# Patient Record
Sex: FEMALE | Race: WHITE | Employment: UNEMPLOYED | URBAN - METROPOLITAN AREA
[De-identification: names, ages, dates, MRNs, and addresses within clinical notes are randomized per-mention and may not be internally consistent; named-entity substitution may affect disease eponyms.]

---

## 2024-03-06 ENCOUNTER — APPOINTMENT (OUTPATIENT)
Dept: CT IMAGING | Age: 64
End: 2024-03-06
Payer: MEDICAID

## 2024-03-06 ENCOUNTER — HOSPITAL ENCOUNTER (OUTPATIENT)
Age: 64
Setting detail: OBSERVATION
Discharge: ANOTHER HOSPITAL | End: 2024-03-08
Attending: EMERGENCY MEDICINE | Admitting: INTERNAL MEDICINE
Payer: MEDICAID

## 2024-03-06 ENCOUNTER — APPOINTMENT (OUTPATIENT)
Dept: GENERAL RADIOLOGY | Age: 64
End: 2024-03-06
Payer: MEDICAID

## 2024-03-06 DIAGNOSIS — F23: ICD-10-CM

## 2024-03-06 DIAGNOSIS — R07.9 CHEST PAIN, UNSPECIFIED TYPE: Primary | ICD-10-CM

## 2024-03-06 LAB
ALBUMIN SERPL-MCNC: 3.8 G/DL (ref 3.5–5.2)
ALBUMIN/GLOB SERPL: 1 {RATIO} (ref 1–2.5)
ALP SERPL-CCNC: 88 U/L (ref 35–104)
ALT SERPL-CCNC: <5 U/L (ref 10–35)
ANION GAP SERPL CALCULATED.3IONS-SCNC: 18 MMOL/L (ref 9–16)
APAP SERPL-MCNC: <5 UG/ML (ref 10–30)
AST SERPL-CCNC: 24 U/L (ref 10–35)
BILIRUB SERPL-MCNC: 0.8 MG/DL (ref 0–1.2)
BUN SERPL-MCNC: 12 MG/DL (ref 8–23)
CA-I BLD-SCNC: 1.17 MMOL/L (ref 1.13–1.33)
CALCIUM SERPL-MCNC: 9.7 MG/DL (ref 8.6–10.4)
CHLORIDE SERPL-SCNC: 99 MMOL/L (ref 98–107)
CO2 SERPL-SCNC: 25 MMOL/L (ref 20–31)
CREAT SERPL-MCNC: 0.8 MG/DL (ref 0.5–0.9)
ERYTHROCYTE [DISTWIDTH] IN BLOOD BY AUTOMATED COUNT: 13.7 % (ref 11.8–14.4)
ETHANOL PERCENT: <0.01 %
ETHANOLAMINE SERPL-MCNC: <10 MG/DL
FLUAV AG SPEC QL: NEGATIVE
FLUBV AG SPEC QL: NEGATIVE
GFR SERPL CREATININE-BSD FRML MDRD: >60 ML/MIN/1.73M2
GLUCOSE SERPL-MCNC: 124 MG/DL (ref 74–99)
HCT VFR BLD AUTO: 49.7 % (ref 36.3–47.1)
HGB BLD-MCNC: 15.9 G/DL (ref 11.9–15.1)
INR PPP: 1
LACTIC ACID, SEPSIS WHOLE BLOOD: 1.4 MMOL/L (ref 0.5–1.9)
LACTIC ACID, SEPSIS WHOLE BLOOD: 2.4 MMOL/L (ref 0.5–1.9)
MAGNESIUM SERPL-MCNC: 1.9 MG/DL (ref 1.6–2.4)
MCH RBC QN AUTO: 29.7 PG (ref 25.2–33.5)
MCHC RBC AUTO-ENTMCNC: 32 G/DL (ref 28.4–34.8)
MCV RBC AUTO: 92.7 FL (ref 82.6–102.9)
NRBC BLD-RTO: 0 PER 100 WBC
PLATELET # BLD AUTO: 185 K/UL (ref 138–453)
PMV BLD AUTO: 12.5 FL (ref 8.1–13.5)
POTASSIUM SERPL-SCNC: 4.2 MMOL/L (ref 3.7–5.3)
PROT SERPL-MCNC: 7.6 G/DL (ref 6.6–8.7)
PROTHROMBIN TIME: 13.3 SEC (ref 11.7–14.9)
RBC # BLD AUTO: 5.36 M/UL (ref 3.95–5.11)
SALICYLATES SERPL-MCNC: <1 MG/DL (ref 3–10)
SODIUM SERPL-SCNC: 142 MMOL/L (ref 136–145)
TOXIC TRICYCLIC SC,BLOOD: NEGATIVE
TROPONIN I SERPL HS-MCNC: 11 NG/L (ref 0–14)
TROPONIN I SERPL HS-MCNC: 12 NG/L (ref 0–14)
TSH SERPL DL<=0.05 MIU/L-ACNC: 2.09 UIU/ML (ref 0.27–4.2)
WBC OTHER # BLD: 9.8 K/UL (ref 3.5–11.3)

## 2024-03-06 PROCEDURE — 84484 ASSAY OF TROPONIN QUANT: CPT

## 2024-03-06 PROCEDURE — 71045 X-RAY EXAM CHEST 1 VIEW: CPT

## 2024-03-06 PROCEDURE — 74177 CT ABD & PELVIS W/CONTRAST: CPT

## 2024-03-06 PROCEDURE — 96361 HYDRATE IV INFUSION ADD-ON: CPT

## 2024-03-06 PROCEDURE — 6360000002 HC RX W HCPCS: Performed by: PEDIATRICS

## 2024-03-06 PROCEDURE — 83605 ASSAY OF LACTIC ACID: CPT

## 2024-03-06 PROCEDURE — 87635 SARS-COV-2 COVID-19 AMP PRB: CPT

## 2024-03-06 PROCEDURE — G0480 DRUG TEST DEF 1-7 CLASSES: HCPCS

## 2024-03-06 PROCEDURE — 80179 DRUG ASSAY SALICYLATE: CPT

## 2024-03-06 PROCEDURE — 80053 COMPREHEN METABOLIC PANEL: CPT

## 2024-03-06 PROCEDURE — 2580000003 HC RX 258: Performed by: PEDIATRICS

## 2024-03-06 PROCEDURE — 83735 ASSAY OF MAGNESIUM: CPT

## 2024-03-06 PROCEDURE — 85027 COMPLETE CBC AUTOMATED: CPT

## 2024-03-06 PROCEDURE — 84443 ASSAY THYROID STIM HORMONE: CPT

## 2024-03-06 PROCEDURE — 93005 ELECTROCARDIOGRAM TRACING: CPT | Performed by: PEDIATRICS

## 2024-03-06 PROCEDURE — 70450 CT HEAD/BRAIN W/O DYE: CPT

## 2024-03-06 PROCEDURE — 96374 THER/PROPH/DIAG INJ IV PUSH: CPT

## 2024-03-06 PROCEDURE — 6360000004 HC RX CONTRAST MEDICATION: Performed by: PEDIATRICS

## 2024-03-06 PROCEDURE — 80143 DRUG ASSAY ACETAMINOPHEN: CPT

## 2024-03-06 PROCEDURE — 84100 ASSAY OF PHOSPHORUS: CPT

## 2024-03-06 PROCEDURE — 87804 INFLUENZA ASSAY W/OPTIC: CPT

## 2024-03-06 PROCEDURE — 96372 THER/PROPH/DIAG INJ SC/IM: CPT

## 2024-03-06 PROCEDURE — 85610 PROTHROMBIN TIME: CPT

## 2024-03-06 PROCEDURE — 80307 DRUG TEST PRSMV CHEM ANLYZR: CPT

## 2024-03-06 PROCEDURE — 87040 BLOOD CULTURE FOR BACTERIA: CPT

## 2024-03-06 PROCEDURE — 51798 US URINE CAPACITY MEASURE: CPT

## 2024-03-06 PROCEDURE — 99285 EMERGENCY DEPT VISIT HI MDM: CPT

## 2024-03-06 PROCEDURE — 82330 ASSAY OF CALCIUM: CPT

## 2024-03-06 RX ORDER — SODIUM CHLORIDE, SODIUM LACTATE, POTASSIUM CHLORIDE, AND CALCIUM CHLORIDE .6; .31; .03; .02 G/100ML; G/100ML; G/100ML; G/100ML
1000 INJECTION, SOLUTION INTRAVENOUS ONCE
Status: COMPLETED | OUTPATIENT
Start: 2024-03-06 | End: 2024-03-06

## 2024-03-06 RX ORDER — MIDAZOLAM HYDROCHLORIDE 2 MG/2ML
3 INJECTION, SOLUTION INTRAMUSCULAR; INTRAVENOUS ONCE
Status: DISCONTINUED | OUTPATIENT
Start: 2024-03-06 | End: 2024-03-06

## 2024-03-06 RX ORDER — HALOPERIDOL 5 MG/ML
2 INJECTION INTRAMUSCULAR ONCE
Status: COMPLETED | OUTPATIENT
Start: 2024-03-06 | End: 2024-03-06

## 2024-03-06 RX ORDER — MIDAZOLAM HYDROCHLORIDE 2 MG/2ML
3 INJECTION, SOLUTION INTRAMUSCULAR; INTRAVENOUS ONCE
Status: COMPLETED | OUTPATIENT
Start: 2024-03-06 | End: 2024-03-06

## 2024-03-06 RX ORDER — LORAZEPAM 2 MG/ML
1 INJECTION INTRAMUSCULAR ONCE
Status: DISCONTINUED | OUTPATIENT
Start: 2024-03-06 | End: 2024-03-06

## 2024-03-06 RX ORDER — SODIUM CHLORIDE, SODIUM LACTATE, POTASSIUM CHLORIDE, AND CALCIUM CHLORIDE .6; .31; .03; .02 G/100ML; G/100ML; G/100ML; G/100ML
1000 INJECTION, SOLUTION INTRAVENOUS ONCE
Status: COMPLETED | OUTPATIENT
Start: 2024-03-06 | End: 2024-03-07

## 2024-03-06 RX ADMIN — MIDAZOLAM HYDROCHLORIDE 3 MG: 1 INJECTION, SOLUTION INTRAMUSCULAR; INTRAVENOUS at 18:02

## 2024-03-06 RX ADMIN — SODIUM CHLORIDE, POTASSIUM CHLORIDE, SODIUM LACTATE AND CALCIUM CHLORIDE 1000 ML: 600; 310; 30; 20 INJECTION, SOLUTION INTRAVENOUS at 22:14

## 2024-03-06 RX ADMIN — IOPAMIDOL 75 ML: 755 INJECTION, SOLUTION INTRAVENOUS at 19:49

## 2024-03-06 RX ADMIN — SODIUM CHLORIDE, POTASSIUM CHLORIDE, SODIUM LACTATE AND CALCIUM CHLORIDE 1000 ML: 600; 310; 30; 20 INJECTION, SOLUTION INTRAVENOUS at 18:14

## 2024-03-06 RX ADMIN — HALOPERIDOL LACTATE 2 MG: 5 INJECTION, SOLUTION INTRAMUSCULAR at 18:01

## 2024-03-06 ASSESSMENT — HEART SCORE
ECG: 0
ECG: 1
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
## 2024-03-06 NOTE — ED NOTES
Patient presents to ED with her brother and her sister. Patient states that she has been having chest pain and thinks that she is having a heart attack. Patient has altered mental status. Patient has history of schizophrenia and has not been taking her meds. Patient has been living with her brother and sister for the past 2 weeks because she was living with her son that was nto taking care of her. EKG is done, labs drawn, on cardiac monitor

## 2024-03-07 VITALS
SYSTOLIC BLOOD PRESSURE: 133 MMHG | HEART RATE: 96 BPM | TEMPERATURE: 98.4 F | BODY MASS INDEX: 28.58 KG/M2 | DIASTOLIC BLOOD PRESSURE: 80 MMHG | WEIGHT: 151.24 LBS | OXYGEN SATURATION: 96 % | RESPIRATION RATE: 15 BRPM

## 2024-03-07 PROBLEM — F23: Status: ACTIVE | Noted: 2024-03-07

## 2024-03-07 PROBLEM — F20.0 ACUTE EXACERBATION OF CHRONIC PARANOID SCHIZOPHRENIA (HCC): Status: ACTIVE | Noted: 2024-03-07

## 2024-03-07 LAB
AMPHET UR QL SCN: NEGATIVE
BACTERIA URNS QL MICRO: ABNORMAL
BARBITURATES UR QL SCN: NEGATIVE
BENZODIAZ UR QL: POSITIVE
BILIRUB UR QL STRIP: NEGATIVE
CANNABINOIDS UR QL SCN: NEGATIVE
CASTS #/AREA URNS LPF: ABNORMAL /LPF (ref 0–2)
CASTS #/AREA URNS LPF: ABNORMAL /LPF (ref 0–2)
CHOLEST SERPL-MCNC: 169 MG/DL (ref 0–199)
CHOLESTEROL/HDL RATIO: 4
CLARITY UR: ABNORMAL
COCAINE UR QL SCN: NEGATIVE
COLOR UR: YELLOW
EPI CELLS #/AREA URNS HPF: ABNORMAL /HPF (ref 0–5)
ERYTHROCYTE [DISTWIDTH] IN BLOOD BY AUTOMATED COUNT: 13.9 % (ref 11.8–14.4)
FENTANYL UR QL: NEGATIVE
GLUCOSE UR STRIP-MCNC: NEGATIVE MG/DL
HCT VFR BLD AUTO: 42.7 % (ref 36.3–47.1)
HDLC SERPL-MCNC: 41 MG/DL
HGB BLD-MCNC: 14.2 G/DL (ref 11.9–15.1)
HGB UR QL STRIP.AUTO: NEGATIVE
KETONES UR STRIP-MCNC: ABNORMAL MG/DL
LDLC SERPL CALC-MCNC: 112 MG/DL (ref 0–100)
LEUKOCYTE ESTERASE UR QL STRIP: ABNORMAL
MAGNESIUM SERPL-MCNC: 2.2 MG/DL (ref 1.6–2.4)
MCH RBC QN AUTO: 30.2 PG (ref 25.2–33.5)
MCHC RBC AUTO-ENTMCNC: 33.3 G/DL (ref 28.4–34.8)
MCV RBC AUTO: 90.9 FL (ref 82.6–102.9)
METHADONE UR QL: NEGATIVE
MUCOUS THREADS URNS QL MICRO: ABNORMAL
NITRITE UR QL STRIP: NEGATIVE
NRBC BLD-RTO: 0 PER 100 WBC
OPIATES UR QL SCN: NEGATIVE
OXYCODONE UR QL SCN: NEGATIVE
PCP UR QL SCN: NEGATIVE
PH UR STRIP: 5.5 [PH] (ref 5–8)
PLATELET # BLD AUTO: 162 K/UL (ref 138–453)
PMV BLD AUTO: 12.6 FL (ref 8.1–13.5)
PROT UR STRIP-MCNC: NEGATIVE MG/DL
RBC # BLD AUTO: 4.7 M/UL (ref 3.95–5.11)
RBC #/AREA URNS HPF: ABNORMAL /HPF (ref 0–2)
SARS-COV-2 RDRP RESP QL NAA+PROBE: NOT DETECTED
SP GR UR STRIP: 1.05 (ref 1–1.03)
SPECIMEN DESCRIPTION: NORMAL
TEST INFORMATION: ABNORMAL
TRIGL SERPL-MCNC: 79 MG/DL
TROPONIN I SERPL HS-MCNC: NORMAL NG/L (ref 0–14)
UROBILINOGEN UR STRIP-ACNC: NORMAL EU/DL (ref 0–1)
VLDLC SERPL CALC-MCNC: 16 MG/DL
WBC #/AREA URNS HPF: ABNORMAL /HPF (ref 0–5)
WBC OTHER # BLD: 8.8 K/UL (ref 3.5–11.3)

## 2024-03-07 PROCEDURE — 81001 URINALYSIS AUTO W/SCOPE: CPT

## 2024-03-07 PROCEDURE — 87077 CULTURE AEROBIC IDENTIFY: CPT

## 2024-03-07 PROCEDURE — 84484 ASSAY OF TROPONIN QUANT: CPT

## 2024-03-07 PROCEDURE — 2580000003 HC RX 258: Performed by: NURSE PRACTITIONER

## 2024-03-07 PROCEDURE — 36415 COLL VENOUS BLD VENIPUNCTURE: CPT

## 2024-03-07 PROCEDURE — 80307 DRUG TEST PRSMV CHEM ANLYZR: CPT

## 2024-03-07 PROCEDURE — 96375 TX/PRO/DX INJ NEW DRUG ADDON: CPT

## 2024-03-07 PROCEDURE — 6360000002 HC RX W HCPCS: Performed by: INTERNAL MEDICINE

## 2024-03-07 PROCEDURE — 83735 ASSAY OF MAGNESIUM: CPT

## 2024-03-07 PROCEDURE — 80061 LIPID PANEL: CPT

## 2024-03-07 PROCEDURE — G0378 HOSPITAL OBSERVATION PER HR: HCPCS

## 2024-03-07 PROCEDURE — 99235 HOSP IP/OBS SAME DATE MOD 70: CPT | Performed by: STUDENT IN AN ORGANIZED HEALTH CARE EDUCATION/TRAINING PROGRAM

## 2024-03-07 PROCEDURE — 85027 COMPLETE CBC AUTOMATED: CPT

## 2024-03-07 PROCEDURE — 93005 ELECTROCARDIOGRAM TRACING: CPT | Performed by: NURSE PRACTITIONER

## 2024-03-07 PROCEDURE — 87086 URINE CULTURE/COLONY COUNT: CPT

## 2024-03-07 PROCEDURE — 87186 SC STD MICRODIL/AGAR DIL: CPT

## 2024-03-07 RX ORDER — ONDANSETRON 2 MG/ML
4 INJECTION INTRAMUSCULAR; INTRAVENOUS EVERY 6 HOURS PRN
Status: DISCONTINUED | OUTPATIENT
Start: 2024-03-07 | End: 2024-03-08 | Stop reason: HOSPADM

## 2024-03-07 RX ORDER — CITALOPRAM HYDROBROMIDE 10 MG/1
10 TABLET ORAL DAILY
Status: DISCONTINUED | OUTPATIENT
Start: 2024-03-07 | End: 2024-03-08 | Stop reason: HOSPADM

## 2024-03-07 RX ORDER — POTASSIUM CHLORIDE 7.45 MG/ML
10 INJECTION INTRAVENOUS PRN
Status: DISCONTINUED | OUTPATIENT
Start: 2024-03-07 | End: 2024-03-08 | Stop reason: HOSPADM

## 2024-03-07 RX ORDER — MAGNESIUM SULFATE 1 G/100ML
1000 INJECTION INTRAVENOUS PRN
Status: DISCONTINUED | OUTPATIENT
Start: 2024-03-07 | End: 2024-03-08 | Stop reason: HOSPADM

## 2024-03-07 RX ORDER — ATORVASTATIN CALCIUM 40 MG/1
40 TABLET, FILM COATED ORAL NIGHTLY
Status: DISCONTINUED | OUTPATIENT
Start: 2024-03-07 | End: 2024-03-08 | Stop reason: HOSPADM

## 2024-03-07 RX ORDER — ACETAMINOPHEN 325 MG/1
650 TABLET ORAL EVERY 6 HOURS PRN
Status: DISCONTINUED | OUTPATIENT
Start: 2024-03-07 | End: 2024-03-08 | Stop reason: HOSPADM

## 2024-03-07 RX ORDER — SODIUM CHLORIDE 9 MG/ML
INJECTION, SOLUTION INTRAVENOUS CONTINUOUS
Status: DISCONTINUED | OUTPATIENT
Start: 2024-03-07 | End: 2024-03-08 | Stop reason: HOSPADM

## 2024-03-07 RX ORDER — ASPIRIN 81 MG/1
81 TABLET, CHEWABLE ORAL DAILY
Status: DISCONTINUED | OUTPATIENT
Start: 2024-03-07 | End: 2024-03-08 | Stop reason: HOSPADM

## 2024-03-07 RX ORDER — ONDANSETRON 4 MG/1
4 TABLET, ORALLY DISINTEGRATING ORAL EVERY 8 HOURS PRN
Status: DISCONTINUED | OUTPATIENT
Start: 2024-03-07 | End: 2024-03-08 | Stop reason: HOSPADM

## 2024-03-07 RX ORDER — POTASSIUM CHLORIDE 20 MEQ/1
40 TABLET, EXTENDED RELEASE ORAL PRN
Status: DISCONTINUED | OUTPATIENT
Start: 2024-03-07 | End: 2024-03-08 | Stop reason: HOSPADM

## 2024-03-07 RX ORDER — NITROFURANTOIN 25; 75 MG/1; MG/1
100 CAPSULE ORAL 2 TIMES DAILY
Qty: 10 CAPSULE | Refills: 0 | Status: ON HOLD | OUTPATIENT
Start: 2024-03-07 | End: 2024-03-12

## 2024-03-07 RX ORDER — SODIUM CHLORIDE 9 MG/ML
INJECTION, SOLUTION INTRAVENOUS PRN
Status: DISCONTINUED | OUTPATIENT
Start: 2024-03-07 | End: 2024-03-08 | Stop reason: HOSPADM

## 2024-03-07 RX ORDER — NITROGLYCERIN 0.4 MG/1
0.4 TABLET SUBLINGUAL EVERY 5 MIN PRN
Status: DISCONTINUED | OUTPATIENT
Start: 2024-03-07 | End: 2024-03-08 | Stop reason: HOSPADM

## 2024-03-07 RX ORDER — SODIUM CHLORIDE 0.9 % (FLUSH) 0.9 %
10 SYRINGE (ML) INJECTION PRN
Status: DISCONTINUED | OUTPATIENT
Start: 2024-03-07 | End: 2024-03-08 | Stop reason: HOSPADM

## 2024-03-07 RX ORDER — SODIUM CHLORIDE 0.9 % (FLUSH) 0.9 %
5-40 SYRINGE (ML) INJECTION EVERY 12 HOURS SCHEDULED
Status: DISCONTINUED | OUTPATIENT
Start: 2024-03-07 | End: 2024-03-08 | Stop reason: HOSPADM

## 2024-03-07 RX ORDER — ENOXAPARIN SODIUM 100 MG/ML
40 INJECTION SUBCUTANEOUS DAILY
Status: DISCONTINUED | OUTPATIENT
Start: 2024-03-07 | End: 2024-03-08 | Stop reason: HOSPADM

## 2024-03-07 RX ORDER — LORAZEPAM 2 MG/ML
1 INJECTION INTRAMUSCULAR EVERY 6 HOURS PRN
Status: DISCONTINUED | OUTPATIENT
Start: 2024-03-07 | End: 2024-03-08 | Stop reason: HOSPADM

## 2024-03-07 RX ORDER — ACETAMINOPHEN 650 MG/1
650 SUPPOSITORY RECTAL EVERY 6 HOURS PRN
Status: DISCONTINUED | OUTPATIENT
Start: 2024-03-07 | End: 2024-03-08 | Stop reason: HOSPADM

## 2024-03-07 RX ORDER — HALOPERIDOL 5 MG/ML
5 INJECTION INTRAMUSCULAR EVERY 6 HOURS PRN
Status: DISCONTINUED | OUTPATIENT
Start: 2024-03-07 | End: 2024-03-08 | Stop reason: HOSPADM

## 2024-03-07 RX ADMIN — SODIUM CHLORIDE: 9 INJECTION, SOLUTION INTRAVENOUS at 17:05

## 2024-03-07 RX ADMIN — Medication 1000 MG: at 06:26

## 2024-03-07 RX ADMIN — SODIUM CHLORIDE: 9 INJECTION, SOLUTION INTRAVENOUS at 04:46

## 2024-03-07 NOTE — ED NOTES
The following labs were labeled with appropriate pt sticker and tubed to lab:     [] Blue     [] Lavender   [] on ice  [] Green/yellow  [] Green/black [] on ice  [] Grey  [] on ice  [] Yellow  [x] Red  [] Pink  [] Type/ Screen  [] ABG  [] VBG    [x] COVID-19 swab    [x] Rapid  [] PCR  [x] Flu swab  [] Peds Viral Panel     [] Urine Sample  [] Fecal Sample  [] Pelvic Cultures  [] Blood Cultures  [] X 2  [] STREP Cultures  [] Wound Cultures
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
## 2024-03-07 NOTE — DISCHARGE SUMMARY
[facility]  Office: [phone]  Tramaine Nino DO, Ander Gaspar DO, Ulises Noguera DO, Calixto Botello DO, Madiha Franklin MD, Sujata Estrella MD, Arjun Santos MD, Amber De La Paz MD,  Keenan Mandujano MD, Sophie Tran MD, Mey Barrett MD,  Marciano Durbin DO, Amadou Hercules MD, Jacoby Shepherd MD, Kumar Nino DO, Louisa Dawson MD,  Lexa Dang DO, Laney Ramires MD, Estefani Rubio MD, Maria G Pabon MD, Patito Negron MD,  Kushal Aaron MD, Tamiko Barnes MD, Kelly Henriquez MD, Rafa Jaffe MD, Sergio Urbina MD, Maria Elena Doyle MD, Elvis Villarreal DO, Pancho Troy DO, Volodymyr Munoz MD,  Jossue Bowling MD, Shirley Waterhouse, CNP,  Swati Whaley, CNP, Cezar Lopez, CNP,  Sana Tierney, DNP, Ioana Brantley, CNP, Elena Garza, CNP, Misti Gonzalez CNP, Sugar Denise, CNP, Eleanor Colindres, CNP, Vandana Daugherty, PA-C, Shanna Harrison, PA-C, Usha Lawson, CNP, Loan Pagan, CNP, Cherri El, CNP, Tory Joseph, CNS, Amaya Pedraza, CNP, Gilda Segovia, CNP, Tracy Schwab, CNP         [facility]   IN-PATIENT SERVICE   [facility]    Discharge Summary     Patient ID: Leila Murphy  :  1960   MRN: [id number]     ACCOUNT:  [id number]   Patient's PCP: Tye Monroy MD  Admit Date: 3/6/2024   Discharge Date: 3/8/2024     Length of Stay: 1  Code Status:  Full Code  Admitting Physician: Maria Elena Doyle MD  Discharge Physician: Maria Elena Doyle MD     Active Discharge Diagnoses:     Hospital Problem Lists:  Principal Problem:    Chest pain  Resolved Problems:    * No resolved hospital problems. *      Admission Condition:  fair     Discharged Condition: good    Hospital Stay:     Hospital Course:     Leila Murphy is a 63 y.o. female with history of schizophrenia with paranoia with worsening psychotic symptoms since [facility] s/p inpatient psychiatric hospitalization  - .  Patient w/ worsening lethargy and re-evaluated at [facility] .
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
## 2024-03-07 NOTE — PROGRESS NOTES
[facility]  PROGRESS NOTE    Shift date: 3/6/2024  Shift day: Wednesday   Shift # 2    Room # 19/19   Name: Leila Murphy                Buddhist:    Place of Denominational:     Referral: Routine Visit    Admit Date & Time: 3/6/2024  5:33 PM    Assessment:  Leila Murphy is a 63 y.o. female in the hospital. Pt was not present in room at time of visit. Family member appeared coping and hopeful when engaged in conversation with .      Intervention:  Writer introduced self and title as . Family did not appear to mind  presence and engaged in conversation about the days events leading to patient's hospital visit.  provided a supportive presence through active listening and words of affirmation.    Outcome:  Family member appeared receptive to listening presence to talk through patient's health and days events.    Plan:  Chaplains will remain available to offer spiritual and emotional support as needed.      Electronically signed by Chaplain Latoya, on 3/6/2024 at 11:52 PM.  Spiritual Health  [place] [facility]  [phone]

## 2024-03-07 NOTE — PROGRESS NOTES
1700- Pt finally agreeable to use commado. 400mls out. Brief slightly wet.   Notified Dr Doyle @ [id number]
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
## 2024-03-07 NOTE — ED NOTES
The patient is a 64 year old female that has a reported history of Schizophrenia. The patient was brought to the ED today due to patient having chest pain and altered mental status. The patient is currently staying with her Bother and Sister. Her Siblings have concern that the patient is no longer able to care for herself due to the patient eating very little and not showering. Patient also incontinent of urine. Despite patient not wanting to eat or drink, patient's sister denied patient verbalizing any paranoid thoughts related to food or drink. The patient is currently laying in bed and not answering questions. When the patient does answer a question she states, \"Don't do it, I know what you are trying to do with me.\" Patient was given Haldol due to patient not wanting labs drawn and imagining done for her chest pain. The patient was psychiatrically hospitalized for similar presentation at [facility] in Jan 2023. Patient was prescribed Zoloft and Risperdal. Per the Sister, the patient's brother Derik Cartwright stopped given the patient her Risperdal. Coincidently, the patient's mental health symptoms became worse.

## 2024-03-07 NOTE — ED PROVIDER NOTES
[facility]     Emergency Department     Faculty Note/ Attestation      Pt Name: Leila Murphy                                       MRN: [id number]  Birthdate 1960  Date of evaluation: 3/6/2024  Note Started: 7:09 PM EST    Patients PCP:    Tye Monroy MD    Attestation  I performed a history and physical examination of the patient and discussed management with the resident. I reviewed the resident’s note and agree with the documented findings and plan of care. Any areas of disagreement are noted on the chart. I was personally present for the key portions of any procedures. I have documented in the chart those procedures where I was not present during the key portions. I have reviewed the emergency nurses triage note. I agree with the chief complaint, past medical history, past surgical history, allergies, medications, social and family history as documented unless otherwise noted below.    For Physician Assistant/ Nurse Practitioner cases/documentation I have personally evaluated this patient and have completed at least one if not all key elements of the E/M (history, physical exam, and MDM). Additional findings are as noted.    Initial Screens:     Heart Score for chest pain patients  History: Highly Suspicious  ECG: Non-Specifc repolarization disturbance/LBTB/PM  Patient Age: > 45 and < 65 years  *Risk factors for Atherosclerotic disease: Obesity  Risk Factors: 1 or 2 risk factors  Troponin: < 1X normal limit  Heart Score Total: 5  [place] Coma Scale  Eye Opening: Spontaneous  Best Verbal Response: Confused  Best Motor Response: Obeys commands  [place] Coma Scale Score: 14    Vitals:    Vitals:    03/06/24 2042 03/06/24 2127 03/06/24 2130 03/06/24 2145   BP:  123/63 115/79 104/71   Pulse: 97 95  89   Resp: 21 19 16 20   Temp:       TempSrc:       SpO2: 96% 97%  98%       CHIEF COMPLAINT       Chief Complaint   Patient presents with    Chest Pain    Altered Mental Status    Rate: normal  Axis: normal  Ectopy: none  Conduction: normal  ST Segments: normal  T Waves: peaked Ts  Q Waves: none    Clinical Impression: Peaked T waves on initial EKG worse in V2 3 [WK]   1906 Repeat EKG 15 minutes later shows still persistent peaked T waves in the precordial leads [WK]   1907 Repeat EKG shows no development of STEMI [WK]   2037 Troponin, High Sensitivity: 12 [LL]   2037 TSH: 2.09 [LL]   2037 Magnesium: 1.9 [LL]   2037 INR: 1.0 [LL]   2037 Calcium, Ionized: 1.17 [LL]   2038 IMPRESSION:  1. No acute abnormality of the abdomen or pelvis is identified.  2. Cholelithiasis.  3. Distended bladder.   [LL]   2038      IMPRESSION:  No acute intracranial abnormality.   [LL]   2038 IMPRESSION:  No radiographic evidence of acute pulmonary disease.     Cardiomegaly.      [LL]      ED Course User Index  [LL] Vandana Maloney MD  [WK] Jose G Cool DO   CRITICAL CARE: There was a high probability of clinically significant/life threatening deterioration in this patient's condition which required my urgent intervention.  Total critical care time was 17 minutes.  This excludes any time for separately reportable procedures.       Jose G Cool DO, RDMS.  Attending Emergency Physician         Jose G Cool DO  03/06/24 4580
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
## 2024-03-07 NOTE — ED NOTES
ED to inpatient nurses report      Chief Complaint:  Chief Complaint   Patient presents with    Chest Pain    Altered Mental Status     Present to ED from: Home    MOA:     LOC: alert and orientated to name, place, date  Mobility: Requires assistance * 2  Oxygen Baseline: RA    Current needs required: 2L O2   Pending ED orders: Urine  Present condition: Stable    Why did the patient come to the ED?     Patient presents to ED with her brother and her sister. Patient states that she has been having chest pain and thinks that she is having a heart attack. Patient has altered mental status. Patient has history of schizophrenia and has not been taking her meds. Patient has been living with her brother and sister for the past 2 weeks because she was living with her son that was nto taking care of her. EKG is done, labs drawn, on cardiac monitor        What is the plan? Observation, Pt IS PINK SLIPPED FOR psychosis  Any procedures or intervention occur? NA  Any safety concerns?? Fall Risk    Mental Status:       Psych Assessment:   Psychosocial  Psychosocial (WDL): Exceptions to WDL  Patient Behaviors: Flight of ideas, Restless, Verbal, Anxious (schizophrenia)  Vital signs   Vitals:    03/06/24 2042 03/06/24 2127 03/06/24 2130 03/06/24 2145   BP:  123/63 115/79 104/71   Pulse: 97 95  89   Resp: 21 19 16 20   Temp:       TempSrc:       SpO2: 96% 97%  98%        Vitals:  Patient Vitals for the past 24 hrs:   BP Temp Temp src Pulse Resp SpO2   03/06/24 2145 104/71 -- -- 89 20 98 %   03/06/24 2130 115/79 -- -- -- 16 --   03/06/24 2127 123/63 -- -- 95 19 97 %   03/06/24 2042 -- -- -- 97 21 96 %   03/06/24 2041 -- -- -- 95 22 96 %   03/06/24 2024 -- -- -- 97 20 96 %   03/06/24 1930 124/75 -- -- 90 20 96 %   03/06/24 1915 118/72 -- -- 90 21 --   03/06/24 1842 -- -- -- 99 19 91 %   03/06/24 1841 -- -- -- 98 22 95 %   03/06/24 1829 -- -- -- (!) 103 17 99 %   03/06/24 1820 -- -- -- (!) 103 (!) 33 95 %   03/06/24 1755 (!) 129/95 --  -- (!) 102 22 97 %   03/06/24 1741 -- 98.8 °F (37.1 °C) Oral -- -- --      Visit Vitals  /71   Pulse 89   Temp 98.8 °F (37.1 °C) (Oral)   Resp 20   SpO2 98%        LDAs:   Peripheral IV 03/06/24 Right Antecubital (Active)       Ambulatory Status:  No data recorded    Diagnosis:  DISPOSITION Decision To Admit 03/06/2024 10:20:28 PM   Final diagnoses:   Chest pain, unspecified type        Consults:  IP CONSULT TO SOCIAL WORK     Treatment Team:   Treatment Team: Attending Provider: Jose G Cool DO; Resident: Vandana Maloney MD; Registered Nurse: Jennifer Golden, RN; Registered Nurse: Christy Ledesma RN    Treatment:  ED Course as of 03/06/24 2221   Wed Mar 06, 2024   1906 EKG Interpretation   Interpreted by Jose G Cool DO    Rhythm: normal sinus   Rate: normal  Axis: normal  Ectopy: none  Conduction: normal  ST Segments: normal  T Waves: peaked Ts  Q Waves: none    Clinical Impression: Peaked T waves on initial EKG worse in V2 3 [WK]   1906 Repeat EKG 15 minutes later shows still persistent peaked T waves in the precordial leads [WK]   1907 Repeat EKG shows no development of STEMI [WK]   2037 Troponin, High Sensitivity: 12 [LL]   2037 TSH: 2.09 [LL]   2037 Magnesium: 1.9 [LL]   2037 INR: 1.0 [LL]   2037 Calcium, Ionized: 1.17 [LL]   2038 IMPRESSION:  1. No acute abnormality of the abdomen or pelvis is identified.  2. Cholelithiasis.  3. Distended bladder.   [LL]   2038      IMPRESSION:  No acute intracranial abnormality.   [LL]   2038 IMPRESSION:  No radiographic evidence of acute pulmonary disease.     Cardiomegaly.      [LL]      ED Course User Index  [LL] Vandana Maloney MD  [WK] Jose G Cool DO          Skin Assessment:        Pain Score:         SOCIAL HISTORY       Social History     Socioeconomic History    Marital status:      Spouse name: None    Number of children: None    Years of education: None    Highest education level: None   Tobacco Use    Smoking status: Never    Smokeless

## 2024-03-07 NOTE — PROGRESS NOTES
1200- Pt no linger NPO; Dietary attempted to get lunc and dinner order. Pt refused to order anything.

## 2024-03-07 NOTE — PLAN OF CARE
Problem: Safety - Adult  Goal: Free from fall injury  Outcome: Progressing     Problem: Discharge Planning  Goal: Discharge to home or other facility with appropriate resources  Outcome: Progressing  Flowsheets (Taken 3/7/2024 0332 by Hanh Love RN)  Discharge to home or other facility with appropriate resources: Identify barriers to discharge with patient and caregiver     Problem: Skin/Tissue Integrity  Goal: Absence of new skin breakdown  Description: 1.  Monitor for areas of redness and/or skin breakdown  2.  Assess vascular access sites hourly  3.  Every 4-6 hours minimum:  Change oxygen saturation probe site  4.  Every 4-6 hours:  If on nasal continuous positive airway pressure, respiratory therapy assess nares and determine need for appliance change or resting period.  Outcome: Progressing

## 2024-03-07 NOTE — ED NOTES
Pt will not allow writer to touch pt.   Pt refusing straight cath.   Writer explained to the pt the importance of straight cath.   Pt still refusing after multiple attempts.

## 2024-03-07 NOTE — CARE COORDINATION
DISCHARGE PLANNING EVALUATION: OP/OBSERVATION        3/7/24, 3:03 PM EST    Leila Murphy         Location: OBS 25/25-1   Reason for hospitalization: Paranoid reaction, acute (HCC) [F23]  Chest pain [R07.9]  Chest pain, unspecified type [R07.9]     CM Services requested for transitional needs.     PCP: Tye Monroy MD    Transportation/Food Security/Housekeeping Addressed:  No issues identified.     Equipment needs: none identified     Transition plan: Obs: To [facility] - pink slip.  Writer called and initiated through [facility].  Waiting for approval.  Transport paperwork is completed in anticipation of bed placement.  Original Pink slip is placed in patient's packet.  RN is notified.

## 2024-03-07 NOTE — CARE COORDINATION
Consult:  visual/auditory hallucinations, schizo, off meds,paranoid, refusing to eat for past 6 days.  Reviewed chart  Noted was  pt seen by ED SW and above issues were addressed and APS report was made 3/6/24.  Writer placed call to APS  this a.m, report was received , however case has not been assigned as yet.  Advised once case has been assigned will notify casewkr to contact SW. ALICJA phone number given.

## 2024-03-08 ENCOUNTER — HOSPITAL ENCOUNTER (INPATIENT)
Age: 64
LOS: 4 days | Discharge: PSYCHIATRIC OR SUBSTANCE USE FACILITY | DRG: 201 | End: 2024-03-12
Attending: INTERNAL MEDICINE | Admitting: INTERNAL MEDICINE
Payer: MEDICAID

## 2024-03-08 ENCOUNTER — HOSPITAL ENCOUNTER (INPATIENT)
Age: 64
LOS: 1 days | Discharge: HOSPICE | DRG: 885 | End: 2024-03-08
Attending: PSYCHIATRY & NEUROLOGY | Admitting: INTERNAL MEDICINE
Payer: MEDICAID

## 2024-03-08 ENCOUNTER — APPOINTMENT (OUTPATIENT)
Age: 64
DRG: 201 | End: 2024-03-08
Attending: INTERNAL MEDICINE
Payer: MEDICAID

## 2024-03-08 VITALS
WEIGHT: 151 LBS | SYSTOLIC BLOOD PRESSURE: 117 MMHG | TEMPERATURE: 97.2 F | HEART RATE: 99 BPM | HEIGHT: 60 IN | DIASTOLIC BLOOD PRESSURE: 76 MMHG | RESPIRATION RATE: 14 BRPM | BODY MASS INDEX: 29.64 KG/M2

## 2024-03-08 DIAGNOSIS — I48.91 ATRIAL FIBRILLATION WITH RVR (HCC): Primary | ICD-10-CM

## 2024-03-08 PROBLEM — F20.0 PARANOID SCHIZOPHRENIA (HCC): Status: ACTIVE | Noted: 2024-03-08

## 2024-03-08 PROBLEM — A41.9 SEVERE SEPSIS WITH ACUTE ORGAN DYSFUNCTION (HCC): Status: ACTIVE | Noted: 2024-03-08

## 2024-03-08 PROBLEM — I42.9 CARDIOMYOPATHY (HCC): Status: ACTIVE | Noted: 2024-03-08

## 2024-03-08 PROBLEM — R65.20 SEVERE SEPSIS WITH ACUTE ORGAN DYSFUNCTION (HCC): Status: ACTIVE | Noted: 2024-03-08

## 2024-03-08 PROBLEM — I21.4 NSTEMI (NON-ST ELEVATED MYOCARDIAL INFARCTION) (HCC): Status: ACTIVE | Noted: 2024-03-08

## 2024-03-08 PROBLEM — R55 PRE-SYNCOPE: Status: ACTIVE | Noted: 2024-03-08

## 2024-03-08 LAB
ANION GAP SERPL CALCULATED.3IONS-SCNC: 16 MMOL/L (ref 9–17)
ANTI-XA UNFRAC HEPARIN: <0.1 IU/L (ref 0.3–0.7)
BASOPHILS # BLD: 0 K/UL (ref 0–0.2)
BASOPHILS NFR BLD: 0 % (ref 0–2)
BUN SERPL-MCNC: 13 MG/DL (ref 8–23)
CALCIUM SERPL-MCNC: 8.7 MG/DL (ref 8.6–10.4)
CHLORIDE SERPL-SCNC: 102 MMOL/L (ref 98–107)
CO2 SERPL-SCNC: 24 MMOL/L (ref 20–31)
CREAT SERPL-MCNC: 0.9 MG/DL (ref 0.5–0.9)
ECHO AO ROOT DIAM: 2.8 CM
ECHO AO ROOT INDEX: 1.69 CM/M2
ECHO AV AREA PEAK VELOCITY: 2.9 CM2
ECHO AV AREA/BSA PEAK VELOCITY: 1.7 CM2/M2
ECHO AV PEAK GRADIENT: 3 MMHG
ECHO AV PEAK VELOCITY: 0.8 M/S
ECHO AV VELOCITY RATIO: 1.25
ECHO BSA: 1.7 M2
ECHO EST RA PRESSURE: 3 MMHG
ECHO IVC PROX: 1.9 CM
ECHO LA DIAMETER INDEX: 2.05 CM/M2
ECHO LA DIAMETER: 3.4 CM
ECHO LA TO AORTIC ROOT RATIO: 1.21
ECHO LV EF PHYSICIAN: 40 %
ECHO LV FRACTIONAL SHORTENING: 8 % (ref 28–44)
ECHO LV INTERNAL DIMENSION DIASTOLE INDEX: 2.29 CM/M2
ECHO LV INTERNAL DIMENSION DIASTOLIC: 3.8 CM (ref 3.9–5.3)
ECHO LV INTERNAL DIMENSION SYSTOLIC INDEX: 2.11 CM/M2
ECHO LV INTERNAL DIMENSION SYSTOLIC: 3.5 CM
ECHO LV IVSD: 1.1 CM (ref 0.6–0.9)
ECHO LV MASS 2D: 143.8 G (ref 67–162)
ECHO LV MASS INDEX 2D: 86.6 G/M2 (ref 43–95)
ECHO LV POSTERIOR WALL DIASTOLIC: 1.2 CM (ref 0.6–0.9)
ECHO LV RELATIVE WALL THICKNESS RATIO: 0.63
ECHO LVOT AREA: 2.5 CM2
ECHO LVOT DIAM: 1.8 CM
ECHO LVOT MEAN GRADIENT: 2 MMHG
ECHO LVOT PEAK GRADIENT: 4 MMHG
ECHO LVOT PEAK VELOCITY: 1 M/S
ECHO LVOT STROKE VOLUME INDEX: 20.8 ML/M2
ECHO LVOT SV: 34.6 ML
ECHO LVOT VTI: 13.6 CM
ECHO MV AREA PLAN: 11.2 CM2
ECHO MV AREA PLAN: 11.2 CM2
ECHO PV MAX VELOCITY: 1 M/S
ECHO PV PEAK GRADIENT: 4 MMHG
ECHO RIGHT VENTRICULAR SYSTOLIC PRESSURE (RVSP): 40 MMHG
ECHO RV BASAL DIMENSION: 3.7 CM
ECHO TV REGURGITANT MAX VELOCITY: 3.04 M/S
ECHO TV REGURGITANT PEAK GRADIENT: 37 MMHG
EKG ATRIAL RATE: 82 BPM
EKG ATRIAL RATE: 99 BPM
EKG P AXIS: 22 DEGREES
EKG P AXIS: 23 DEGREES
EKG P-R INTERVAL: 128 MS
EKG P-R INTERVAL: 146 MS
EKG Q-T INTERVAL: 330 MS
EKG Q-T INTERVAL: 374 MS
EKG QRS DURATION: 76 MS
EKG QRS DURATION: 84 MS
EKG QTC CALCULATION (BAZETT): 423 MS
EKG QTC CALCULATION (BAZETT): 436 MS
EKG R AXIS: -10 DEGREES
EKG R AXIS: -7 DEGREES
EKG T AXIS: 14 DEGREES
EKG T AXIS: 6 DEGREES
EKG VENTRICULAR RATE: 82 BPM
EKG VENTRICULAR RATE: 99 BPM
EOSINOPHIL # BLD: 0 K/UL (ref 0–0.4)
EOSINOPHILS RELATIVE PERCENT: 0 % (ref 0–4)
ERYTHROCYTE [DISTWIDTH] IN BLOOD BY AUTOMATED COUNT: 14.5 % (ref 11.5–14.9)
GFR SERPL CREATININE-BSD FRML MDRD: >60 ML/MIN/1.73M2
GLUCOSE BLD-MCNC: 144 MG/DL (ref 65–105)
GLUCOSE SERPL-MCNC: 190 MG/DL (ref 70–99)
HCT VFR BLD AUTO: 42.7 % (ref 36–46)
HGB BLD-MCNC: 13.6 G/DL (ref 12–16)
INR PPP: 1.1
LACTATE BLDV-SCNC: 1.9 MMOL/L (ref 0.5–2.2)
LYMPHOCYTES NFR BLD: 0.7 K/UL (ref 1–4.8)
LYMPHOCYTES RELATIVE PERCENT: 6 % (ref 24–44)
MAGNESIUM SERPL-MCNC: 1.7 MG/DL (ref 1.6–2.6)
MCH RBC QN AUTO: 29.4 PG (ref 26–34)
MCHC RBC AUTO-ENTMCNC: 31.8 G/DL (ref 31–37)
MCV RBC AUTO: 92.3 FL (ref 80–100)
MICROORGANISM SPEC CULT: ABNORMAL
MONOCYTES NFR BLD: 1.2 K/UL (ref 0.1–1.3)
MONOCYTES NFR BLD: 10 % (ref 1–7)
NEUTROPHILS NFR BLD: 84 % (ref 36–66)
NEUTS SEG NFR BLD: 10 K/UL (ref 1.3–9.1)
PARTIAL THROMBOPLASTIN TIME: 33.8 SEC (ref 24–36)
PLATELET # BLD AUTO: 189 K/UL (ref 150–450)
PMV BLD AUTO: 10.4 FL (ref 6–12)
POTASSIUM SERPL-SCNC: 3.8 MMOL/L (ref 3.7–5.3)
PROTHROMBIN TIME: 14.8 SEC (ref 11.8–14.6)
RBC # BLD AUTO: 4.62 M/UL (ref 4–5.2)
SODIUM SERPL-SCNC: 142 MMOL/L (ref 135–144)
SPECIMEN DESCRIPTION: ABNORMAL
TROPONIN I SERPL HS-MCNC: 22 NG/L (ref 0–14)
TROPONIN I SERPL HS-MCNC: 29 NG/L (ref 0–14)
WBC OTHER # BLD: 11.9 K/UL (ref 3.5–11)

## 2024-03-08 PROCEDURE — 1240000000 HC EMOTIONAL WELLNESS R&B

## 2024-03-08 PROCEDURE — G0378 HOSPITAL OBSERVATION PER HR: HCPCS

## 2024-03-08 PROCEDURE — 2580000003 HC RX 258

## 2024-03-08 PROCEDURE — 84484 ASSAY OF TROPONIN QUANT: CPT

## 2024-03-08 PROCEDURE — 85520 HEPARIN ASSAY: CPT

## 2024-03-08 PROCEDURE — 85610 PROTHROMBIN TIME: CPT

## 2024-03-08 PROCEDURE — 99222 1ST HOSP IP/OBS MODERATE 55: CPT | Performed by: PSYCHIATRY & NEUROLOGY

## 2024-03-08 PROCEDURE — 51798 US URINE CAPACITY MEASURE: CPT

## 2024-03-08 PROCEDURE — 83735 ASSAY OF MAGNESIUM: CPT

## 2024-03-08 PROCEDURE — 80048 BASIC METABOLIC PNL TOTAL CA: CPT

## 2024-03-08 PROCEDURE — 2500000003 HC RX 250 WO HCPCS: Performed by: INTERNAL MEDICINE

## 2024-03-08 PROCEDURE — 99223 1ST HOSP IP/OBS HIGH 75: CPT | Performed by: INTERNAL MEDICINE

## 2024-03-08 PROCEDURE — 93005 ELECTROCARDIOGRAM TRACING: CPT

## 2024-03-08 PROCEDURE — 85025 COMPLETE CBC W/AUTO DIFF WBC: CPT

## 2024-03-08 PROCEDURE — 85730 THROMBOPLASTIN TIME PARTIAL: CPT

## 2024-03-08 PROCEDURE — 2060000000 HC ICU INTERMEDIATE R&B

## 2024-03-08 PROCEDURE — 83605 ASSAY OF LACTIC ACID: CPT

## 2024-03-08 PROCEDURE — 87086 URINE CULTURE/COLONY COUNT: CPT

## 2024-03-08 PROCEDURE — 93306 TTE W/DOPPLER COMPLETE: CPT

## 2024-03-08 PROCEDURE — 93306 TTE W/DOPPLER COMPLETE: CPT | Performed by: INTERNAL MEDICINE

## 2024-03-08 PROCEDURE — 36415 COLL VENOUS BLD VENIPUNCTURE: CPT

## 2024-03-08 PROCEDURE — 82947 ASSAY GLUCOSE BLOOD QUANT: CPT

## 2024-03-08 PROCEDURE — 99222 1ST HOSP IP/OBS MODERATE 55: CPT | Performed by: INTERNAL MEDICINE

## 2024-03-08 PROCEDURE — 6360000002 HC RX W HCPCS

## 2024-03-08 PROCEDURE — G0379 DIRECT REFER HOSPITAL OBSERV: HCPCS

## 2024-03-08 RX ORDER — HALOPERIDOL 5 MG/1
5 TABLET ORAL EVERY 6 HOURS PRN
Status: DISCONTINUED | OUTPATIENT
Start: 2024-03-08 | End: 2024-03-08 | Stop reason: HOSPADM

## 2024-03-08 RX ORDER — SODIUM CHLORIDE 0.9 % (FLUSH) 0.9 %
5-40 SYRINGE (ML) INJECTION PRN
Status: DISCONTINUED | OUTPATIENT
Start: 2024-03-08 | End: 2024-03-12 | Stop reason: HOSPADM

## 2024-03-08 RX ORDER — MAGNESIUM HYDROXIDE/ALUMINUM HYDROXICE/SIMETHICONE 120; 1200; 1200 MG/30ML; MG/30ML; MG/30ML
30 SUSPENSION ORAL EVERY 6 HOURS PRN
Status: DISCONTINUED | OUTPATIENT
Start: 2024-03-08 | End: 2024-03-08 | Stop reason: HOSPADM

## 2024-03-08 RX ORDER — IBUPROFEN 400 MG/1
400 TABLET ORAL EVERY 6 HOURS PRN
OUTPATIENT
Start: 2024-03-08

## 2024-03-08 RX ORDER — DIPHENHYDRAMINE HYDROCHLORIDE 50 MG/ML
50 INJECTION INTRAMUSCULAR; INTRAVENOUS EVERY 6 HOURS PRN
Status: DISCONTINUED | OUTPATIENT
Start: 2024-03-08 | End: 2024-03-08 | Stop reason: HOSPADM

## 2024-03-08 RX ORDER — HEPARIN SODIUM 10000 [USP'U]/100ML
5-30 INJECTION, SOLUTION INTRAVENOUS CONTINUOUS
Status: DISCONTINUED | OUTPATIENT
Start: 2024-03-08 | End: 2024-03-09

## 2024-03-08 RX ORDER — DIGOXIN 0.25 MG/ML
250 INJECTION INTRAMUSCULAR; INTRAVENOUS ONCE
Status: COMPLETED | OUTPATIENT
Start: 2024-03-08 | End: 2024-03-08

## 2024-03-08 RX ORDER — MIDODRINE HYDROCHLORIDE 2.5 MG/1
2.5 TABLET ORAL 3 TIMES DAILY PRN
Status: DISCONTINUED | OUTPATIENT
Start: 2024-03-08 | End: 2024-03-12 | Stop reason: HOSPADM

## 2024-03-08 RX ORDER — METOPROLOL TARTRATE 1 MG/ML
2.5 INJECTION, SOLUTION INTRAVENOUS ONCE
Status: DISCONTINUED | OUTPATIENT
Start: 2024-03-08 | End: 2024-03-08

## 2024-03-08 RX ORDER — HEPARIN SODIUM 1000 [USP'U]/ML
4000 INJECTION, SOLUTION INTRAVENOUS; SUBCUTANEOUS ONCE
Status: COMPLETED | OUTPATIENT
Start: 2024-03-08 | End: 2024-03-08

## 2024-03-08 RX ORDER — IBUPROFEN 400 MG/1
400 TABLET ORAL EVERY 6 HOURS PRN
Status: DISCONTINUED | OUTPATIENT
Start: 2024-03-08 | End: 2024-03-08 | Stop reason: HOSPADM

## 2024-03-08 RX ORDER — ONDANSETRON 2 MG/ML
4 INJECTION INTRAMUSCULAR; INTRAVENOUS EVERY 6 HOURS PRN
Status: DISCONTINUED | OUTPATIENT
Start: 2024-03-08 | End: 2024-03-12 | Stop reason: HOSPADM

## 2024-03-08 RX ORDER — HYDROXYZINE 50 MG/1
50 TABLET, FILM COATED ORAL 3 TIMES DAILY PRN
OUTPATIENT
Start: 2024-03-08

## 2024-03-08 RX ORDER — TRAZODONE HYDROCHLORIDE 50 MG/1
50 TABLET ORAL NIGHTLY PRN
Status: DISCONTINUED | OUTPATIENT
Start: 2024-03-08 | End: 2024-03-08 | Stop reason: HOSPADM

## 2024-03-08 RX ORDER — SODIUM CHLORIDE 9 MG/ML
INJECTION, SOLUTION INTRAVENOUS PRN
Status: DISCONTINUED | OUTPATIENT
Start: 2024-03-08 | End: 2024-03-12 | Stop reason: HOSPADM

## 2024-03-08 RX ORDER — SODIUM CHLORIDE 0.9 % (FLUSH) 0.9 %
5-40 SYRINGE (ML) INJECTION EVERY 12 HOURS SCHEDULED
Status: DISCONTINUED | OUTPATIENT
Start: 2024-03-08 | End: 2024-03-12 | Stop reason: HOSPADM

## 2024-03-08 RX ORDER — LORAZEPAM 1 MG/1
2 TABLET ORAL EVERY 6 HOURS PRN
Status: DISCONTINUED | OUTPATIENT
Start: 2024-03-08 | End: 2024-03-08 | Stop reason: HOSPADM

## 2024-03-08 RX ORDER — LORAZEPAM 2 MG/ML
2 INJECTION INTRAMUSCULAR EVERY 6 HOURS PRN
Status: DISCONTINUED | OUTPATIENT
Start: 2024-03-08 | End: 2024-03-08 | Stop reason: HOSPADM

## 2024-03-08 RX ORDER — MAGNESIUM SULFATE HEPTAHYDRATE 40 MG/ML
2000 INJECTION, SOLUTION INTRAVENOUS PRN
Status: DISCONTINUED | OUTPATIENT
Start: 2024-03-08 | End: 2024-03-12 | Stop reason: HOSPADM

## 2024-03-08 RX ORDER — ACETAMINOPHEN 650 MG/1
650 SUPPOSITORY RECTAL EVERY 6 HOURS PRN
Status: DISCONTINUED | OUTPATIENT
Start: 2024-03-08 | End: 2024-03-12 | Stop reason: HOSPADM

## 2024-03-08 RX ORDER — POLYETHYLENE GLYCOL 3350 17 G
2 POWDER IN PACKET (EA) ORAL
OUTPATIENT
Start: 2024-03-08

## 2024-03-08 RX ORDER — ENOXAPARIN SODIUM 100 MG/ML
40 INJECTION SUBCUTANEOUS DAILY
Status: DISCONTINUED | OUTPATIENT
Start: 2024-03-08 | End: 2024-03-08

## 2024-03-08 RX ORDER — LORAZEPAM 1 MG/1
2 TABLET ORAL EVERY 6 HOURS PRN
OUTPATIENT
Start: 2024-03-08

## 2024-03-08 RX ORDER — HEPARIN SODIUM 1000 [USP'U]/ML
2000 INJECTION, SOLUTION INTRAVENOUS; SUBCUTANEOUS PRN
Status: DISCONTINUED | OUTPATIENT
Start: 2024-03-08 | End: 2024-03-10

## 2024-03-08 RX ORDER — ACETAMINOPHEN 325 MG/1
650 TABLET ORAL EVERY 6 HOURS PRN
OUTPATIENT
Start: 2024-03-08

## 2024-03-08 RX ORDER — HALOPERIDOL 5 MG/ML
5 INJECTION INTRAMUSCULAR EVERY 6 HOURS PRN
Status: DISCONTINUED | OUTPATIENT
Start: 2024-03-08 | End: 2024-03-08 | Stop reason: HOSPADM

## 2024-03-08 RX ORDER — 0.9 % SODIUM CHLORIDE 0.9 %
1000 INTRAVENOUS SOLUTION INTRAVENOUS ONCE
Status: COMPLETED | OUTPATIENT
Start: 2024-03-08 | End: 2024-03-08

## 2024-03-08 RX ORDER — HALOPERIDOL 5 MG/ML
5 INJECTION INTRAMUSCULAR EVERY 6 HOURS PRN
OUTPATIENT
Start: 2024-03-08

## 2024-03-08 RX ORDER — POTASSIUM CHLORIDE 7.45 MG/ML
10 INJECTION INTRAVENOUS PRN
Status: DISCONTINUED | OUTPATIENT
Start: 2024-03-08 | End: 2024-03-12 | Stop reason: HOSPADM

## 2024-03-08 RX ORDER — POLYETHYLENE GLYCOL 3350 17 G
2 POWDER IN PACKET (EA) ORAL
Status: DISCONTINUED | OUTPATIENT
Start: 2024-03-08 | End: 2024-03-08 | Stop reason: HOSPADM

## 2024-03-08 RX ORDER — HYDROXYZINE 50 MG/1
50 TABLET, FILM COATED ORAL 3 TIMES DAILY PRN
Status: DISCONTINUED | OUTPATIENT
Start: 2024-03-08 | End: 2024-03-08 | Stop reason: HOSPADM

## 2024-03-08 RX ORDER — POLYETHYLENE GLYCOL 3350 17 G/17G
17 POWDER, FOR SOLUTION ORAL DAILY PRN
Status: DISCONTINUED | OUTPATIENT
Start: 2024-03-08 | End: 2024-03-12 | Stop reason: HOSPADM

## 2024-03-08 RX ORDER — POLYETHYLENE GLYCOL 3350 17 G/17G
17 POWDER, FOR SOLUTION ORAL DAILY PRN
Status: DISCONTINUED | OUTPATIENT
Start: 2024-03-08 | End: 2024-03-08 | Stop reason: HOSPADM

## 2024-03-08 RX ORDER — POLYETHYLENE GLYCOL 3350 17 G/17G
17 POWDER, FOR SOLUTION ORAL DAILY PRN
OUTPATIENT
Start: 2024-03-08

## 2024-03-08 RX ORDER — DIPHENHYDRAMINE HYDROCHLORIDE 50 MG/ML
50 INJECTION INTRAMUSCULAR; INTRAVENOUS EVERY 6 HOURS PRN
OUTPATIENT
Start: 2024-03-08

## 2024-03-08 RX ORDER — TRAZODONE HYDROCHLORIDE 50 MG/1
50 TABLET ORAL NIGHTLY PRN
OUTPATIENT
Start: 2024-03-08

## 2024-03-08 RX ORDER — POTASSIUM CHLORIDE 20 MEQ/1
40 TABLET, EXTENDED RELEASE ORAL PRN
Status: DISCONTINUED | OUTPATIENT
Start: 2024-03-08 | End: 2024-03-12 | Stop reason: HOSPADM

## 2024-03-08 RX ORDER — ACETAMINOPHEN 325 MG/1
650 TABLET ORAL EVERY 6 HOURS PRN
Status: DISCONTINUED | OUTPATIENT
Start: 2024-03-08 | End: 2024-03-12 | Stop reason: HOSPADM

## 2024-03-08 RX ORDER — ENOXAPARIN SODIUM 100 MG/ML
1 INJECTION SUBCUTANEOUS ONCE
Status: CANCELLED | OUTPATIENT
Start: 2024-03-08 | End: 2024-03-08

## 2024-03-08 RX ORDER — 0.9 % SODIUM CHLORIDE 0.9 %
500 INTRAVENOUS SOLUTION INTRAVENOUS ONCE
Status: COMPLETED | OUTPATIENT
Start: 2024-03-08 | End: 2024-03-08

## 2024-03-08 RX ORDER — ACETAMINOPHEN 325 MG/1
650 TABLET ORAL EVERY 6 HOURS PRN
Status: DISCONTINUED | OUTPATIENT
Start: 2024-03-08 | End: 2024-03-08 | Stop reason: HOSPADM

## 2024-03-08 RX ORDER — LORAZEPAM 2 MG/ML
2 INJECTION INTRAMUSCULAR EVERY 6 HOURS PRN
OUTPATIENT
Start: 2024-03-08

## 2024-03-08 RX ORDER — ONDANSETRON 4 MG/1
4 TABLET, ORALLY DISINTEGRATING ORAL EVERY 8 HOURS PRN
Status: DISCONTINUED | OUTPATIENT
Start: 2024-03-08 | End: 2024-03-12 | Stop reason: HOSPADM

## 2024-03-08 RX ORDER — SODIUM CHLORIDE 9 MG/ML
INJECTION, SOLUTION INTRAVENOUS CONTINUOUS
Status: DISCONTINUED | OUTPATIENT
Start: 2024-03-08 | End: 2024-03-11

## 2024-03-08 RX ORDER — HALOPERIDOL 5 MG/1
5 TABLET ORAL EVERY 6 HOURS PRN
OUTPATIENT
Start: 2024-03-08

## 2024-03-08 RX ORDER — HEPARIN SODIUM 1000 [USP'U]/ML
4000 INJECTION, SOLUTION INTRAVENOUS; SUBCUTANEOUS PRN
Status: DISCONTINUED | OUTPATIENT
Start: 2024-03-08 | End: 2024-03-10

## 2024-03-08 RX ORDER — ENOXAPARIN SODIUM 100 MG/ML
1 INJECTION SUBCUTANEOUS DAILY
Status: DISCONTINUED | OUTPATIENT
Start: 2024-03-09 | End: 2024-03-08

## 2024-03-08 RX ORDER — MAGNESIUM HYDROXIDE/ALUMINUM HYDROXICE/SIMETHICONE 120; 1200; 1200 MG/30ML; MG/30ML; MG/30ML
30 SUSPENSION ORAL EVERY 6 HOURS PRN
OUTPATIENT
Start: 2024-03-08

## 2024-03-08 RX ADMIN — HEPARIN SODIUM 12 UNITS/KG/HR: 10000 INJECTION, SOLUTION INTRAVENOUS at 18:38

## 2024-03-08 RX ADMIN — SODIUM CHLORIDE 500 ML: 9 INJECTION, SOLUTION INTRAVENOUS at 17:14

## 2024-03-08 RX ADMIN — SODIUM CHLORIDE 1000 ML: 9 INJECTION, SOLUTION INTRAVENOUS at 12:00

## 2024-03-08 RX ADMIN — HEPARIN SODIUM 4000 UNITS: 1000 INJECTION, SOLUTION INTRAVENOUS; SUBCUTANEOUS at 18:34

## 2024-03-08 RX ADMIN — AMIODARONE HYDROCHLORIDE 1 MG/MIN: 1.8 INJECTION, SOLUTION INTRAVENOUS at 12:32

## 2024-03-08 RX ADMIN — SODIUM CHLORIDE: 9 INJECTION, SOLUTION INTRAVENOUS at 13:16

## 2024-03-08 RX ADMIN — AMIODARONE HYDROCHLORIDE 1 MG/MIN: 1.8 INJECTION, SOLUTION INTRAVENOUS at 18:27

## 2024-03-08 RX ADMIN — AMIODARONE HYDROCHLORIDE 150 MG: 1.5 INJECTION, SOLUTION INTRAVENOUS at 12:18

## 2024-03-08 RX ADMIN — SODIUM CHLORIDE: 9 INJECTION, SOLUTION INTRAVENOUS at 20:28

## 2024-03-08 RX ADMIN — DIGOXIN 250 MCG: 0.25 INJECTION INTRAMUSCULAR; INTRAVENOUS at 17:01

## 2024-03-08 ASSESSMENT — PATIENT HEALTH QUESTIONNAIRE - PHQ9
SUM OF ALL RESPONSES TO PHQ QUESTIONS 1-9: 0
1. LITTLE INTEREST OR PLEASURE IN DOING THINGS: 0
SUM OF ALL RESPONSES TO PHQ9 QUESTIONS 1 & 2: 0
2. FEELING DOWN, DEPRESSED OR HOPELESS: 0
SUM OF ALL RESPONSES TO PHQ QUESTIONS 1-9: 0

## 2024-03-08 ASSESSMENT — SLEEP AND FATIGUE QUESTIONNAIRES
AVERAGE NUMBER OF SLEEP HOURS: 7
DO YOU USE A SLEEP AID: NO
DO YOU HAVE DIFFICULTY SLEEPING: NO

## 2024-03-08 ASSESSMENT — LIFESTYLE VARIABLES
HOW OFTEN DO YOU HAVE A DRINK CONTAINING ALCOHOL: NEVER
HOW MANY STANDARD DRINKS CONTAINING ALCOHOL DO YOU HAVE ON A TYPICAL DAY: PATIENT DOES NOT DRINK

## 2024-03-08 ASSESSMENT — PAIN SCALES - GENERAL: PAINLEVEL_OUTOF10: 0
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
## 2024-03-08 NOTE — CARE COORDINATION
Case Management Assessment  Initial Evaluation    Date/Time of Evaluation: 3/8/2024 4:00 PM  Assessment Completed by: Mely Martin RN    If patient is discharged prior to next notation, then this note serves as note for discharge by case management.    Patient Name: Leila Murphy                   YOB: 1960  Diagnosis: Pre-syncope [R55]  Severe sepsis with acute organ dysfunction (HCC) [A41.9, R65.20]                   Date / Time: 3/8/2024 11:24 AM    Patient Admission Status: Inpatient   Readmission Risk (Low < 19, Mod (19-27), High > 27): Readmission Risk Score: 8.8    Current PCP: Tye Monroy MD  PCP verified by CM? No (Patient states her PCP is Kumar Snowden, [place], [place])    Chart Reviewed: Yes      History Provided by: Patient  Patient Orientation: Alert and Oriented    Patient Cognition: Alert    Hospitalization in the last 30 days (Readmission):  Yes    If yes, Readmission Assessment in CM Navigator will be completed.    Advance Directives:      Code Status: Full Code   Patient's Primary Decision Maker is: Patient Declined (Legal Next of Kin Remains as Decision Maker)      Discharge Planning:    Patient lives with: Family Members Type of Home: House  Primary Care Giver: Family  Patient Support Systems include: Family Members   Current Financial resources: None  Current community resources: Other (Comment) (Behavioral)  Current services prior to admission: None            Current DME:              Type of Home Care services:  None    ADLS  Prior functional level: Independent in ADLs/IADLs  Current functional level: Independent in ADLs/IADLs    PT AM-PAC:   /24  OT AM-PAC:   /24    Family can provide assistance at DC: Yes  Would you like Case Management to discuss the discharge plan with any other family members/significant others, and if so, who? Yes (Valentina, sister)  Plans to Return to Present Housing: Unknown at present  Other Identified Issues/Barriers to RETURNING to current  housing: Behavioral issues.   Potential Assistance needed at discharge: N/A            Potential DME:    Patient expects to discharge to: House  Plan for transportation at discharge: Family    Financial    Payor: /     Does insurance require precert for SNF: NO INSURNANCE    Potential assistance Purchasing Medications: Yes  Meds-to-Beds request:        [facility] [id number]  [place] - [address] - P [phone] - F [phone]  [address] [place] [address]  Phone: [phone] Fax: [phone]      Notes:    Factors facilitating achievement of predicted outcomes: Family support    Barriers to discharge: Dizziness.    Additional Case Management Notes: The patient is alert and oriented, able to answer questions. Readmit from [facility]. Patient was sent from [facility] (pink slipped). Patient states that she lives with her brother and sister in a 2 story home, independent, and drives. Patient states she does NOT have insurance and she has a PCP by the name of Kumar Cody MD (not listed, not able to be verified). Patient was a rapid response from [facility]. Cardiology consulted for A.fib with RVR. Amiodarone GTT started.     The Plan for Transition of Care is related to the following treatment goals of Pre-syncope [R55]  Severe sepsis with acute organ dysfunction (HCC) [A41.9, R65.20]    IF APPLICABLE: The Patient and/or patient representative Leila and her family were provided with a choice of provider and agrees with the discharge plan. Freedom of choice list with basic dialogue that supports the patient's individualized plan of care/goals and shares the quality data associated with the providers was provided to: Patient   Patient Representative Name:       The Patient and/or Patient Representative Agree with the Discharge Plan? Yes    Mely Martin RN  Case Management Department  Ph: [phone] Fax: [phone]

## 2024-03-08 NOTE — TRANSITION OF CARE
FALL, RAPID RESPONSE and TRANSFER OF CARE  Patient experienced a witnessed fall @ 0857 in the dayroom while carrying her breakfast tray. Vitals were taken- 103/60 pulse 99. Patient states she did not hit her head and did not lose consciousness, no injuries noted. Patient was alert and talking clearly. At 0910, while sitting in chair with staff, patient became pale and light headed sliding down in the chair requiring staff to lower her to the floor. A RAT was called, vitals taken- 117/76, pulse 90, blood sugar 124, patient was alert and responsive. Dr Claros, [facility] H- manager, Charge Nurse Guillermina were present and Dr Bazan notified. New orders to discharge re-admit to PCU, Bed 2113.

## 2024-03-08 NOTE — PROGRESS NOTES
Pt came from [facility] HR was 200's and SBP was in 80's and 90's. EKG was done. A. Fib w/ RVR noted.  IV placed. Residents at bedside and place NS bolus order which was started right away. Cardiology consult noted.     Writer consulted Dr. Lorenzo and explained the pt was brought over from [facility] and her HR in 200's and BP low w/ a fluid bolus running. Dr. Lorenzo gave orders for IV Amio bolus and gtt once the bolus finished. He ordered a transfer to ICU.    Amio bolus given and gtt started. Pt's HR came down to the 140's-150's, /73. Writer asked if he still wanted the pt in ICU since we were able to bring down the HR some w/ the gtt. He stated the pt was ok to stay on PCU. No new orders noted
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
## 2024-03-08 NOTE — DISCHARGE INSTRUCTIONS
Information:  Medications:   Medication summary provided   I understand that I should take only the medications on my list.     -why and when I need to take each medicine.     -which side effects to watch for.     -that I should carry my medication information at all times in case of     Emergency situations.    I will take all of my medicines to follow up appointments.     -check with my physician or pharmacist before taking any new    Medication, over the counter product or drink alcohol.    -Ask about food, drug or dietary supplement interactions.    -discard old lists and update records with medication providers.    Notify Physician:  Notify physician if you notice:   Always call 911 if you feel your life is in danger  In case of an emergency call 911 immediately!  If 911 is not available call your local emergency medical system for help    Behavioral Health Follow Up:  Original Referral Source:[facility]  Discharge Diagnosis: Paranoid schizophrenia (HCC) [F20.0]  Recommendations for Level of Care: follow up  Patient status at discharge: transferred to [facility] Rm 2116  My hospital  was: [facility]  Aftercare plan faxed: yes   -faxed by: N/A   -date: 3/8/24   -time: 1000  Prescriptions: N/A    Smoking: Quit Smoking.   Call the [employer]'s smoking quitline at 4-103-18V-QUIT  Know the signs of a heart attack   If you have any of the following symptoms call 911 immediately, do not wait more    Than five minutes.    1. Pressure, fullness and/ or squeezing in the center of the chest spreading to    The jaw, neck or shoulder.    2. Chest discomfort with light headedness, fainting, sweating, nausea or    Shortness of breath.   3. Upper abdominal pressure or discomfort.   4. Lower chest pain, back pain, unusual fatigue, shortness of breath, nausea   Or dizziness.     General Information:   Questions regarding your bill: Call HELP program [phone]     Suicide Hotline ([facility] Crisis Care Line)  (144)

## 2024-03-08 NOTE — PROGRESS NOTES
Writer called "[employer] to confirm PCS and face sheet were faxed over. Original  time was around 2230. [employer] updated that  will be delayed about one hour.
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
## 2024-03-08 NOTE — PROGRESS NOTES
PT transferred from [facility]. Pt. Asymptomatic with heart rate in the 200's. BP systolic in the 80's. EKG done. Found to be in AFIB with RVR Notified residents and cardiology. Started 1 L bolus, and amnio drip. Last blood pressure 101/63. Heart rate sustaining in the 150s.

## 2024-03-08 NOTE — PROGRESS NOTES
Writer informed residents that patients BP was sustaining systolic in the upper 80'. Also that patient was bladder scan and retaining 221. See orders

## 2024-03-08 NOTE — PROGRESS NOTES
Pharmacy monitoring of Heparin infusion  Heparin Infusion New Order    Patient on heparin for:  atrial fibrillation    Was patient on previous oral anticoagulant/dose?:  no , Confirmed with RN/Pt/Pts family/Surescripts?: yes    Factor Xa inhibitor/LMWH use within the past 72 hours? NO  If yes, date of last administration: n/a    Recent Labs     03/06/24  1804 03/07/24  0457 03/08/24  1225   HGB 15.9* 14.2 13.6   INR 1.0  --   --     162 189       Heparin to be monitored using anti-Xa for duration of therapy.(aPTT should be used for patients who have received a DOAC in the past 72 hours)?      Have admin instructions been updated to reflect appropriate protocol? YES    Have appropriate labs been ordered for corresponding protocol? YES   *Discontinue anti-Xa lab monitoring if using aPTT protocol    Is the starting rate/last rate adjustment appropriate? yes    Concurrent anticoagulants: none  (Note it is not appropriate to be on most anticoagulants while on a heparin drip)    Review platelets from the past few days.  Any concerns?: No, platelets 189 K    Please record any appropriate additional notes here:      Jose Miguel Pandya,  AGUSTINA.Ph.  3/8/2024  5:50 PM

## 2024-03-08 NOTE — PROGRESS NOTES
63-year-old female with history of schizophrenia is been admitted at [facility] for further management of acute psychosis.  Patient was initially admitted at [facility] for chest pain recommended to have stress test but she refused and currently not admitted at [facility]  This morning patient fell, no report of hitting head, patient alert and oriented but very poor historian, did complain of some dizziness lightheadedness  After the fall when the nurses were trying to ambulate her, patient got very pale and dizzy and was about to fall again.  So rapid response was called.  Likely had a presyncopal episode  Her blood pressure was in 70s, blood sugar in 100s   complain of some dizziness no chest pain or shortness of breath  Positive for UTI, not on any antibiotics currently  Will admit on the medical floor, check CBC BMP start antibiotic start IV fluids, give a liter bolus  Monitor urine culture  Check EKG troponin.
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
## 2024-03-08 NOTE — PROGRESS NOTES
[facility]  Medicare Certification Upon Admission    I certify that this patient's inpatient psychiatric hospital admission is medically necessary for:    [x] (1) Treatment which could reasonably be expected to improve this patient's condition,       [x] (2) Or for diagnostic study;     AND     [x](2) The inpatient psychiatric services are provided while the individual is under the care of a physician and are included in the individualized plan of care.    Estimated length of stay/service 2-9 days    Plan for post-hospital care -outpatient care    Electronically signed by SIOBHAN HARDIN MD on 3/8/2024 at 9:08 AM
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
## 2024-03-08 NOTE — BH NOTE
Patient transferred via wheelchair and 2 staff to [facility]-  2113.No belongings to be sent as non were logged in on admission.

## 2024-03-08 NOTE — BH NOTE
[facility]  Discharge Note    Pt discharged with followings belongings:   Dental Appliances: None  Vision - Corrective Lenses: None  Hearing Aid: None  Jewelry: None  Body Piercings Removed: N/A  Clothing: At home  Other Valuables: Other (Comment) (none)   Valuables sent home with N/A or returned to patient. Patient educated on aftercare instructions: yes  Information faxed to N/A by staff  at 11:16 AM .Patient verbalize understanding of AVS:  yes.    Status EXAM upon discharge:  Mental Status and Behavioral Exam  Normal: No  Level of Assistance: Independent/Self  Facial Expression: Flat, Worried  Affect: Blunt  Level of Consciousness: Alert  Frequency of Checks: 4 times per hour, close  Mood:Normal: No  Mood: Anxious  Motor Activity:Normal: Yes  Eye Contact: Fair  Observed Behavior: Preoccupied, Cooperative  Sexual Misconduct History: Current - no  Preception: [place] to person, [place] to time, [place] to place  Attention:Normal: No  Attention: Distractible  Thought Processes: Blocking  Thought Content:Normal: No  Thought Content: Preoccupations, Poverty of content  Depression Symptoms: Loss of interest, Impaired concentration  Anxiety Symptoms: Generalized  Amberly Symptoms: No problems reported or observed.  Hallucinations: None  Delusions: No  Memory:Normal: No  Memory: Poor recent  Insight and Judgment: No  Insight and Judgment: Poor judgment, Poor insight    Tobacco Screening:  Practical Counseling, on admission, gilles X, if applicable and completed (first 3 are required if patient doesn't refuse):            ( ) Recognizing danger situations (included triggers and roadblocks)                    ( ) Coping skills (new ways to manage stress,relaxation techniques, changing routine, distraction)                                                           ( ) Basic information about quitting (benefits of quitting, techniques in how to quit, available resources  ( ) [facility]                                                                                                                   ( ) Patient refused counseling  ( ) Patient refused referral  ( ) Patient refused prescription upon discharge  ( x ) Patient has not smoked in the last 30 days    Metabolic Screening:    No results found for: \"LABA1C\"    Lab Results   Component Value Date    CHOL 169 03/07/2024     Lab Results   Component Value Date    TRIG 79 03/07/2024     Lab Results   Component Value Date    HDL 41 03/07/2024     No components found for: \"LDLCAL\"  No components found for: \"LABVLDL\"    Patient discharge- readmit to [facility] RM 9737. No belongings logged in for patient, Patient was transported via wheelchair and 2 staff.    Jerrica Herrera RN

## 2024-03-08 NOTE — TRANSITION OF CARE
[facility]al [facility] Transition Record to Provider    Patient Name: Leila Murphy  YOB: 1960   Medical Record Number: [id number]  Date of Admission: 3/8/2024 12:35 AM   Date of Discharge: 3/8/24    Attending Provider: Pranay Bazan MD   Discharging Provider: Beni  To contact this individual call [phone] ask the  to page.  If unavailable, ask to be transferred to Behavioral Health Provider on call.  A Behavioral Health Provider will be available on call 24/7 and during holidays.    Primary Care Provider: Tye Monroy MD    No Known Allergies    Reason for Admission: Poor self care/off med's, not eating, drinking, or moving last 2 days in hospital     Admission Diagnosis: Paranoid schizophrenia (HCC) [F20.0]    * No surgery found *    No results found for this visit on 03/08/24.    Immunizations administered during this encounter:   There is no immunization history on file for this patient.  None of the above/Not documented/Unable to determine from medical record documentation    Screening for Metabolic Disorders for Patients on Antipsychotic Medications  (Data obtained from the EMR)    Estimated Body Mass Index  Body mass index is 29.49 kg/m².      Vital Signs/Blood Pressure  /76   Pulse 99   Temp 97.2 °F (36.2 °C) (Oral)   Resp 14   Ht 1.524 m (5')   Wt 68.5 kg (151 lb)   BMI 29.49 kg/m²      Fasting Blood Glucose or Hemoglobin A1c  No results found for: \"GLU\", \"GLUCPOC\"    No results found for: \"LABA1C\", \"XHS0HRPG\"    Discharge Diagnosis: Paranoid schizophrenia (HCC)     Discharge Plan/Destination: [facility] RM 2113    Discharge Medication List and Instructions:      Medication List        ASK your doctor about these medications      citalopram 10 MG tablet  Commonly known as: CeleXA  Take 1 tablet by mouth daily.  Notes to patient: Lowers depression     nitrofurantoin (macrocrystal-monohydrate) 100 MG capsule  Commonly known as: MACROBID  Take 1 capsule by

## 2024-03-09 LAB
ABSOLUTE BANDS: 0.07 K/UL (ref 0–1)
ANION GAP SERPL CALCULATED.3IONS-SCNC: 9 MMOL/L (ref 9–17)
ANTI-XA UNFRAC HEPARIN: 0.2 IU/L (ref 0.3–0.7)
ANTI-XA UNFRAC HEPARIN: 0.49 IU/L (ref 0.3–0.7)
BANDS: 1 % (ref 0–10)
BASOPHILS # BLD: 0 K/UL (ref 0–0.2)
BASOPHILS NFR BLD: 0 % (ref 0–2)
BUN SERPL-MCNC: 11 MG/DL (ref 8–23)
CALCIUM SERPL-MCNC: 8.2 MG/DL (ref 8.6–10.4)
CHLORIDE SERPL-SCNC: 104 MMOL/L (ref 98–107)
CO2 SERPL-SCNC: 26 MMOL/L (ref 20–31)
CREAT SERPL-MCNC: 0.7 MG/DL (ref 0.5–0.9)
EOSINOPHIL # BLD: 0.14 K/UL (ref 0–0.4)
EOSINOPHILS RELATIVE PERCENT: 2 % (ref 0–4)
ERYTHROCYTE [DISTWIDTH] IN BLOOD BY AUTOMATED COUNT: 14.5 % (ref 11.5–14.9)
GFR SERPL CREATININE-BSD FRML MDRD: >60 ML/MIN/1.73M2
GLUCOSE BLD-MCNC: 105 MG/DL (ref 65–105)
GLUCOSE SERPL-MCNC: 117 MG/DL (ref 70–99)
HCT VFR BLD AUTO: 40.8 % (ref 36–46)
HGB BLD-MCNC: 12.8 G/DL (ref 12–16)
LYMPHOCYTES NFR BLD: 1.35 K/UL (ref 1–4.8)
LYMPHOCYTES RELATIVE PERCENT: 19 % (ref 24–44)
MAGNESIUM SERPL-MCNC: 1.8 MG/DL (ref 1.6–2.6)
MCH RBC QN AUTO: 29 PG (ref 26–34)
MCHC RBC AUTO-ENTMCNC: 31.4 G/DL (ref 31–37)
MCV RBC AUTO: 92.1 FL (ref 80–100)
MONOCYTES NFR BLD: 0.57 K/UL (ref 0.1–1.3)
MONOCYTES NFR BLD: 8 % (ref 1–7)
MORPHOLOGY: NORMAL
NEUTROPHILS NFR BLD: 70 % (ref 36–66)
NEUTS SEG NFR BLD: 4.97 K/UL (ref 1.3–9.1)
PLATELET # BLD AUTO: 169 K/UL (ref 150–450)
PMV BLD AUTO: 11.2 FL (ref 6–12)
POTASSIUM SERPL-SCNC: 3 MMOL/L (ref 3.7–5.3)
RBC # BLD AUTO: 4.43 M/UL (ref 4–5.2)
SODIUM SERPL-SCNC: 139 MMOL/L (ref 135–144)
TROPONIN I SERPL HS-MCNC: 32 NG/L (ref 0–14)
WBC OTHER # BLD: 7.1 K/UL (ref 3.5–11)

## 2024-03-09 PROCEDURE — 6360000002 HC RX W HCPCS: Performed by: INTERNAL MEDICINE

## 2024-03-09 PROCEDURE — 83735 ASSAY OF MAGNESIUM: CPT

## 2024-03-09 PROCEDURE — 36415 COLL VENOUS BLD VENIPUNCTURE: CPT

## 2024-03-09 PROCEDURE — 2500000003 HC RX 250 WO HCPCS: Performed by: INTERNAL MEDICINE

## 2024-03-09 PROCEDURE — 6370000000 HC RX 637 (ALT 250 FOR IP): Performed by: INTERNAL MEDICINE

## 2024-03-09 PROCEDURE — 84484 ASSAY OF TROPONIN QUANT: CPT

## 2024-03-09 PROCEDURE — 80048 BASIC METABOLIC PNL TOTAL CA: CPT

## 2024-03-09 PROCEDURE — 82947 ASSAY GLUCOSE BLOOD QUANT: CPT

## 2024-03-09 PROCEDURE — 99233 SBSQ HOSP IP/OBS HIGH 50: CPT | Performed by: INTERNAL MEDICINE

## 2024-03-09 PROCEDURE — 85025 COMPLETE CBC W/AUTO DIFF WBC: CPT

## 2024-03-09 PROCEDURE — 85520 HEPARIN ASSAY: CPT

## 2024-03-09 PROCEDURE — 2060000000 HC ICU INTERMEDIATE R&B

## 2024-03-09 PROCEDURE — 2580000003 HC RX 258

## 2024-03-09 PROCEDURE — 6360000002 HC RX W HCPCS

## 2024-03-09 PROCEDURE — APPSS30 APP SPLIT SHARED TIME 16-30 MINUTES: Performed by: NURSE PRACTITIONER

## 2024-03-09 PROCEDURE — 99222 1ST HOSP IP/OBS MODERATE 55: CPT | Performed by: INTERNAL MEDICINE

## 2024-03-09 RX ORDER — ASPIRIN 81 MG/1
81 TABLET, CHEWABLE ORAL DAILY
Status: DISCONTINUED | OUTPATIENT
Start: 2024-03-09 | End: 2024-03-12 | Stop reason: HOSPADM

## 2024-03-09 RX ORDER — FUROSEMIDE 10 MG/ML
20 INJECTION INTRAMUSCULAR; INTRAVENOUS ONCE
Status: COMPLETED | OUTPATIENT
Start: 2024-03-09 | End: 2024-03-09

## 2024-03-09 RX ORDER — ATORVASTATIN CALCIUM 40 MG/1
40 TABLET, FILM COATED ORAL NIGHTLY
Status: DISCONTINUED | OUTPATIENT
Start: 2024-03-09 | End: 2024-03-12 | Stop reason: HOSPADM

## 2024-03-09 RX ORDER — ENOXAPARIN SODIUM 100 MG/ML
1 INJECTION SUBCUTANEOUS 2 TIMES DAILY
Status: DISCONTINUED | OUTPATIENT
Start: 2024-03-09 | End: 2024-03-10

## 2024-03-09 RX ADMIN — ASPIRIN 81 MG: 81 TABLET, CHEWABLE ORAL at 13:56

## 2024-03-09 RX ADMIN — ATORVASTATIN CALCIUM 40 MG: 40 TABLET, FILM COATED ORAL at 20:10

## 2024-03-09 RX ADMIN — FUROSEMIDE 20 MG: 10 INJECTION, SOLUTION INTRAMUSCULAR; INTRAVENOUS at 18:03

## 2024-03-09 RX ADMIN — ENOXAPARIN SODIUM 70 MG: 100 INJECTION SUBCUTANEOUS at 20:10

## 2024-03-09 RX ADMIN — CEFTRIAXONE SODIUM 1000 MG: 1 INJECTION, POWDER, FOR SOLUTION INTRAMUSCULAR; INTRAVENOUS at 18:03

## 2024-03-09 RX ADMIN — CEFTRIAXONE SODIUM 1000 MG: 1 INJECTION, POWDER, FOR SOLUTION INTRAMUSCULAR; INTRAVENOUS at 01:44

## 2024-03-09 RX ADMIN — SODIUM CHLORIDE: 9 INJECTION, SOLUTION INTRAVENOUS at 01:42

## 2024-03-09 RX ADMIN — AMIODARONE HYDROCHLORIDE 0.5 MG/MIN: 1.8 INJECTION, SOLUTION INTRAVENOUS at 01:44

## 2024-03-09 RX ADMIN — ENOXAPARIN SODIUM 70 MG: 100 INJECTION SUBCUTANEOUS at 13:56

## 2024-03-09 RX ADMIN — HEPARIN SODIUM 2000 UNITS: 1000 INJECTION, SOLUTION INTRAVENOUS; SUBCUTANEOUS at 02:01

## 2024-03-09 RX ADMIN — METOPROLOL TARTRATE 25 MG: 25 TABLET, FILM COATED ORAL at 13:56

## 2024-03-09 ASSESSMENT — PAIN SCALES - GENERAL
PAINLEVEL_OUTOF10: 0

## 2024-03-09 NOTE — PLAN OF CARE
Problem: Discharge Planning  Goal: Discharge to home or other facility with appropriate resources  3/9/2024 0446 by Chantel Mcdonald RN  Outcome: Progressing     Problem: Pain  Goal: Verbalizes/displays adequate comfort level or baseline comfort level  3/9/2024 0446 by Chantel Mcdonald RN  Outcome: Progressing     Problem: Safety - Adult  Goal: Free from fall injury  3/9/2024 0446 by Chantel Mcdonald RN  Outcome: Progressing  Note: The patient remained free from falls this shift, call light within reach, bed in locked and lowest position.  Side rails up x2.       Problem: Cardiovascular - Adult  Goal: Absence of cardiac dysrhythmias or at baseline  Outcome: Progressing  Note: Pt on amiodarone drip, pt converted to SR this shift.      Problem: Genitourinary - Adult  Goal: Absence of urinary retention  Outcome: Progressing     Problem: Infection - Adult  Goal: Absence of infection at discharge  Outcome: Progressing  Flowsheets (Taken 3/9/2024 0446)  Absence of infection at discharge:   Assess and monitor for signs and symptoms of infection   Monitor lab/diagnostic results   Administer medications as ordered  Note: Pt on IV Rocephin this shift.

## 2024-03-09 NOTE — CODE DOCUMENTATION
/76, 94% on 2 L NC. . Pt normally is not on o2. Per [facility] staff she dropped to 90% so 2 L was placed on her.

## 2024-03-09 NOTE — PLAN OF CARE
Problem: Discharge Planning  Goal: Discharge to home or other facility with appropriate resources  3/9/2024 1822 by Rupali Meléndez RN  Outcome: Progressing     Problem: Pain  Goal: Verbalizes/displays adequate comfort level or baseline comfort level  3/9/2024 1822 by Rupali Meléndez RN  Outcome: Progressing     Problem: ABCDS Injury Assessment  Goal: Absence of physical injury  Outcome: Progressing     Problem: Safety - Adult  Goal: Free from fall injury  3/9/2024 1822 by Rupali Meléndez RN  Outcome: Progressing     Problem: Cardiovascular - Adult  Goal: Absence of cardiac dysrhythmias or at baseline  3/9/2024 1822 by Rupali Meléndez RN  Outcome: Progressing     Problem: Genitourinary - Adult  Goal: Absence of urinary retention  3/9/2024 1822 by Rupali Meléndez RN  Outcome: Progressing     Problem: Infection - Adult  Goal: Absence of infection at discharge  3/9/2024 1822 by Rupali Meléndez RN  Outcome: Progressing

## 2024-03-09 NOTE — PROGRESS NOTES
[facility]  IN-PATIENT SERVICE  [facility]    PROGRESS NOTE             3/9/2024    7:35 AM    Name:   Leila Murphy  MRN:     [id number]     Acct:      [id number]   Room:   2113/2113-01   Day:  1  Admit Date:  3/8/2024 11:24 AM    PCP:  Tye Monroy MD  Code Status:  Full Code    Subjective:     C/C: No chief complaint on file.    Interval History Status: improved.      Patient seen and examined at bedside.  Patient lying comfortably in bed, with flat affect.  Patient states that she is doing okay when questioned repeatedly.  Patient states that she does not want labs to be drawn as they have to get enough blood from her.  Patient in normal sinus rhythm with normal heart rate.  Denying any shortness of breath or chest pain.  When questioned who can be called to talk about her health, patient states her Sister Valentina, patient provided number.  We will await psychiatry input for patient's ability to make her own decisions.    Brief History:     63-year-old female known case of schizophrenia with paranoia, recently admitted at [facility] (discharged yesterday) chest pain and altered mental status.  Patient at that admission did have nonspecific EKG changes with normal tropes, was evaluated by cardiology who recommended a Lexiscan stress test but patient refused.  Patient was discharged to be readmitted at [facility].  Upon transfer to [facility] patient had an episode of presyncope, rapid response was called patient's blood sugar remained normal.  Patient was found to be hypotensive, patient moved to the floor.   Patient evaluated at bedside.  Patient had elevated heart rate in the 180s to 200s. Patient also hypotensive w/ blood pressure 97/57. Given 1L fluid bolus.  EKG done which showed patient in A-fib with RVR. Cardiology consulted.  Patient denies any chest pain, shortness of breath, lightheadedness, poor historian, difficult to obtain history.    Review of Systems:  dysfunction (HCC) [A41.9, R65.20] 03/08/2024    Pre-syncope [R55] 03/08/2024       Plan:        Afib w/ RVR  - New onset Afib, HR in the 190s - resolved  - Cardiology consulted - Started Amiodorone ggt - being weaned off now   - Cardio recs heart cath Monday if patient consentable  - 1 dose of 250 mg Digoxin  - Echo: reduced LVSF, EF 40%  - Trop elevated at 22>29  - Started on heparin ggt    Pre-syncope likely 2/2 hypotension  - Patient only hypotensive since being moved to the floor  - No electrolyte abnormality, blood glucose within normal range,  - Lactic acid normal  - White count mildly elevated  - Given 1.5 L IV bolus  - on 50ml/hr fluids  - Midodrine 2.5 mg PRN  - UA 3/7 indicates UTI, Urine culture pending  - on IV Rocephin    Schizophrenia w/ paranoia  - Patient transferred from [facility]  - Psych consulted, recs appreciated   - Patient with flat affect.  Not conversing.  - Needs evaluation for patient's ability to make her own decisions.       DVT prophylaxis: heparin ggt for elevated trops  GI prophylaxis: n/a  Diet: Regular diet  Disposition: Likely back to [facility] once stable     OT/PT/SW     Code Status: Full code      Plan will be discussed with the attending, Dr Tani Rae MD  PGY I Family Medicine Resident  3/9/2024 7:35 AM        Attending Physician Statement  I have discussed the care of Leila Murphy and I have examined the patient myselft and taken ros and hpi , including pertinent history and exam findings,  with the resident. I have reviewed the key elements of all parts of the encounter with the resident.  I agree with the assessment, plan and orders as documented by the resident.  A-fib RVR,  Non-ST elevation MI,  Cardiology on board, full dose Lovenox, cardiac meds,  Possible cath soon, patient will not be able to get the consent, will contact the family members, psych on board      Electronically signed by Romi Freire MD

## 2024-03-09 NOTE — PROGRESS NOTES
Dr. Sorensen, Cardiology was notified of Troponin still trending up. Writer was at bedside with doctor and had writer d/c amio drip and heparin drip. Aspirin, lopressor, and Lipitor were also added. Start Lovenox 2 hours after stopping heparin. Repeat trop today (as charted above) and repeat tomorrow morning. He talked to patient about cardiac cath on Monday. Patient had flat affect and did not give much feedback if she wanted to go through with it or not. MD states he will readdress tomorrow.

## 2024-03-09 NOTE — PROGRESS NOTES
03/06/2024 Not Detected  Not Detected Final    Comment:       Rapid NAAT:  The specimen is NEGATIVE for SARS-CoV-2, the novel coronavirus associated with   COVID-19.        The ID NOW COVID-19 assay is designed to detect the virus that causes COVID-19 in patients   with signs and symptoms of infection who are suspected of COVID-19.  An individual without symptoms of COVID-19 and who is not shedding SARS-CoV-2 virus would   expect to have a negative (not detected) result in this assay.  Negative results should be treated as presumptive and, if inconsistent with clinical signs   and symptoms or necessary for patient management,  should be tested with an alternative molecular assay. Negative results do not preclude   SARS-CoV-2 infection and   should not be used as the sole basis for patient management decisions.         Fact sheet for Healthcare Providers: https://www.fda.gov/media/551273/download  Fact sheet for Patients: https://www.fda.gov/media/372873/download        Methodology: Isothermal Nucleic Acid Amplification      Flu A Antigen 03/06/2024 NEGATIVE  NEGATIVE Final    for Influenza A Antigen    Flu B Antigen 03/06/2024 NEGATIVE  NEGATIVE Final    for Influenza B Antigen.    WBC 03/07/2024 8.8  3.5 - 11.3 k/uL Final    RBC 03/07/2024 4.70  3.95 - 5.11 m/uL Final    Hemoglobin 03/07/2024 14.2  11.9 - 15.1 g/dL Final    Hematocrit 03/07/2024 42.7  36.3 - 47.1 % Final    MCV 03/07/2024 90.9  82.6 - 102.9 fL Final    MCH 03/07/2024 30.2  25.2 - 33.5 pg Final    MCHC 03/07/2024 33.3  28.4 - 34.8 g/dL Final    RDW 03/07/2024 13.9  11.8 - 14.4 % Final    Platelets 03/07/2024 162  138 - 453 k/uL Final    MPV 03/07/2024 12.6  8.1 - 13.5 fL Final    NRBC Automated 03/07/2024 0.0  0.0 per 100 WBC Final    Cholesterol 03/07/2024 169  0 - 199 mg/dL Final    Comment:    Cholesterol Guidelines:      <200  Desirable   200-240  Borderline      >240  Undesirable         HDL 03/07/2024 41  >40 mg/dL Final    Comment:    HDL  effervescent tablet 40 mEq, 40 mEq, Oral, PRN **OR** potassium chloride 10 mEq/100 mL IVPB (Peripheral Line), 10 mEq, IntraVENous, PRN  magnesium sulfate 2000 mg in water 50 mL IVPB, 2,000 mg, IntraVENous, PRN  ondansetron (ZOFRAN-ODT) disintegrating tablet 4 mg, 4 mg, Oral, Q8H PRN **OR** ondansetron (ZOFRAN) injection 4 mg, 4 mg, IntraVENous, Q6H PRN  polyethylene glycol (GLYCOLAX) packet 17 g, 17 g, Oral, Daily PRN  acetaminophen (TYLENOL) tablet 650 mg, 650 mg, Oral, Q6H PRN **OR** acetaminophen (TYLENOL) suppository 650 mg, 650 mg, Rectal, Q6H PRN  0.9 % sodium chloride infusion, , IntraVENous, Continuous  cefTRIAXone (ROCEPHIN) 1,000 mg in sodium chloride 0.9 % 50 mL IVPB (mini-bag), 1,000 mg, IntraVENous, Q24H  heparin (porcine) injection 4,000 Units, 4,000 Units, IntraVENous, PRN  heparin (porcine) injection 2,000 Units, 2,000 Units, IntraVENous, PRN  midodrine (PROAMATINE) tablet 2.5 mg, 2.5 mg, Oral, TID PRN      ASSESSMENT    DSM-5 Diagnosis:    Acute exacerbation of chronic paranoid schizophrenia       Patient Active Problem List   Diagnosis    Chest pain    Paranoid reaction, acute (HCC)    Acute exacerbation of chronic paranoid schizophrenia (HCC)    Paranoid schizophrenia (HCC)    Pre-syncope    Severe sepsis with acute organ dysfunction ([facility])    Atrial fibrillation with RVR ([facility])    NSTEMI (non-ST elevated myocardial infarction) ([facility])    Cardiomyopathy ([facility])        QTc Calculation ([facility])   Date Value Ref Range Status   03/07/2024 436 ms Final        PLAN    Medication management per psychiatrist.  Additional recommendations will follow the clinical course.     Thank you very much for allowing us to participate in the care of this patient.    Electronically signed by VIOLETA Linder CNP on 3/9/2024 at 6:50 PM     **This report has been created using voice recognition software. It may contain minor errors which are inherent in voice recognition technology.**

## 2024-03-09 NOTE — PROGRESS NOTES
Physical Therapy           Physical Therapy Cancel Note      DATE: 3/9/2024    NAME: Leila Murphy  MRN: [id number]   : 1960      Patient seen this date for Physical Therapy due to:    Patient independent with functional mobility without a device in the room. No skilled PT services necessary, will defer PT evaluation at this time. Please reorder PT if future needs arise.       Electronically signed by Yoly Rodriguez PT on 3/9/2024 at 2:54 PM

## 2024-03-09 NOTE — PROGRESS NOTES
Lab notified RN that pt is refusing AM labs. Writer asked lab to come back to try again due to pt being on a heparin drip. Writer and day shift RN educated pt on importance of lab draws. Pt stated \"I've been poked enough\". Day shift RN to attempt again when lab rounds.

## 2024-03-09 NOTE — CONSULTS
ondansetron, polyethylene glycol, acetaminophen **OR** acetaminophen, heparin (porcine), heparin (porcine), midodrine     Allergies:  Patient has no known allergies.    Social History:   reports that she has never smoked. She has never used smokeless tobacco. She reports that she does not drink alcohol and does not use drugs.     Family History: Unable to get    Review of Systems   CONSTITUTIONAL:  negative for fevers, chills, fatigue and malaise    EYES:  negative for discharge    HEENT:  negative for epistaxis and sore throat    RESPIRATORY:  negative for cough, shortness of breath, wheezing    CARDIOVASCULAR:  negative for chest pain, palpitations, syncope, edema    GASTROINTESTINAL:  negative for nausea, vomiting, diarrhea, constipation, abdominal pain    GENITOURINARY:  negative for incontinence    MUSCULOSKELETAL:  negative for neck or back pain    NEUROLOGICAL:  negative for headaches, seizures and double vision   PSYCHIATRIC:  negative               PHYSICAL EXAM:    Blood pressure 117/73, pulse 75, temperature 97.8 °F (36.6 °C), temperature source Oral, resp. rate 16, height 1.524 m (5'), weight 72.7 kg (160 lb 4.4 oz), SpO2 94 %.    CONSTITUTIONAL: AOx4, no apparent distress, appears stated age   HEAD: normocephalic, atraumatic   EYES: PERRLA, EOMI   ENT: moist mucous membranes, uvula midline   NECK:  symmetric, no midline tenderness to palpation   LUNGS: clear to auscultation bilaterally   CARDIOVASCULAR: regular rate and rhythm, no murmurs, rubs or gallops   ABDOMEN: Soft, non-tender, non-distended with normal active bowel sounds   SKIN: no rash       DATA:    ECG:NSR minimal criteria LVH     Echo: 3/8/24      Left Ventricle: Reduced left ventricular systolic function. EF by visual approximation is 40%. Left ventricle size is normal. Mildly increased wall thickness. Difficult to assess due to tachycardia/afib. Unable to assess wall motion. Abnormal diastolic function.    Tricuspid Valve: Moderate to  beta-blocker  At this time patient was on heparin for comfort and ease , I will change to heparin to Lovenox full dose  At this time blood pressure is okay heart rate is okay I will start on small dose of beta-blockers  Will start baby aspirin  Will start on Lipitor  Patient need a heart cath if she agrees or if patient had a family member that we can discuss over the weekend  Will continue to follow  Please do troponin tomorrow morning  Patient had psych problem as per primary team and psych team    Discussed with patient and nursing.    Storm Sorensen MD, MD [facility]        [phone]

## 2024-03-09 NOTE — PROGRESS NOTES
Spoke with sister Valentina. She states patient lives with her son Cezar. She would like to visit patient. She was upset she was never told the patient transferred from [facility] to [place]. She was told what room patient is in and will talk further about cardiac cath when she gets here.

## 2024-03-09 NOTE — CODE DOCUMENTATION
RRT arrived. Pt was lying on the floor, alert and oriented w/ 2 L NC. Per [facility] staff pt fell 20 mins prior to RRT. They stated she didn't hit her head. She was then placed in a chair. She had to go to the bathroom, as the [facility] tech was helping her up she felt like she was going to pass out. Pt was guided to the floor per [facility] staff

## 2024-03-09 NOTE — PROGRESS NOTES
Called and spoke with Dr. Sorensen (cardiology) about patient swelling in hands and legs. Order for Lasix 20mg one time dose was ordered.

## 2024-03-10 LAB
ANION GAP SERPL CALCULATED.3IONS-SCNC: 13 MMOL/L (ref 9–17)
BASOPHILS # BLD: 0 K/UL (ref 0–0.2)
BASOPHILS NFR BLD: 1 % (ref 0–2)
BUN SERPL-MCNC: 7 MG/DL (ref 8–23)
CALCIUM SERPL-MCNC: 8.3 MG/DL (ref 8.6–10.4)
CHLORIDE SERPL-SCNC: 102 MMOL/L (ref 98–107)
CO2 SERPL-SCNC: 26 MMOL/L (ref 20–31)
CREAT SERPL-MCNC: 0.6 MG/DL (ref 0.5–0.9)
EOSINOPHIL # BLD: 0.1 K/UL (ref 0–0.4)
EOSINOPHILS RELATIVE PERCENT: 3 % (ref 0–4)
ERYTHROCYTE [DISTWIDTH] IN BLOOD BY AUTOMATED COUNT: 14.2 % (ref 11.5–14.9)
GFR SERPL CREATININE-BSD FRML MDRD: >60 ML/MIN/1.73M2
GLUCOSE SERPL-MCNC: 102 MG/DL (ref 70–99)
HCT VFR BLD AUTO: 40.6 % (ref 36–46)
HGB BLD-MCNC: 13.1 G/DL (ref 12–16)
LYMPHOCYTES NFR BLD: 1.1 K/UL (ref 1–4.8)
LYMPHOCYTES RELATIVE PERCENT: 20 % (ref 24–44)
MAGNESIUM SERPL-MCNC: 1.7 MG/DL (ref 1.6–2.6)
MCH RBC QN AUTO: 29.4 PG (ref 26–34)
MCHC RBC AUTO-ENTMCNC: 32.3 G/DL (ref 31–37)
MCV RBC AUTO: 91.2 FL (ref 80–100)
MICROORGANISM SPEC CULT: NORMAL
MONOCYTES NFR BLD: 0.7 K/UL (ref 0.1–1.3)
MONOCYTES NFR BLD: 13 % (ref 1–7)
NEUTROPHILS NFR BLD: 63 % (ref 36–66)
NEUTS SEG NFR BLD: 3.4 K/UL (ref 1.3–9.1)
PLATELET # BLD AUTO: 188 K/UL (ref 150–450)
PMV BLD AUTO: 9.8 FL (ref 6–12)
POTASSIUM SERPL-SCNC: 2.9 MMOL/L (ref 3.7–5.3)
RBC # BLD AUTO: 4.45 M/UL (ref 4–5.2)
SERVICE CMNT-IMP: NORMAL
SERVICE CMNT-IMP: NORMAL
SODIUM SERPL-SCNC: 141 MMOL/L (ref 135–144)
SPECIMEN DESCRIPTION: NORMAL
TROPONIN I SERPL HS-MCNC: 30 NG/L (ref 0–14)
WBC OTHER # BLD: 5.4 K/UL (ref 3.5–11)

## 2024-03-10 PROCEDURE — 6370000000 HC RX 637 (ALT 250 FOR IP): Performed by: INTERNAL MEDICINE

## 2024-03-10 PROCEDURE — 97535 SELF CARE MNGMENT TRAINING: CPT

## 2024-03-10 PROCEDURE — 2580000003 HC RX 258

## 2024-03-10 PROCEDURE — 6360000002 HC RX W HCPCS

## 2024-03-10 PROCEDURE — 99233 SBSQ HOSP IP/OBS HIGH 50: CPT | Performed by: INTERNAL MEDICINE

## 2024-03-10 PROCEDURE — 97166 OT EVAL MOD COMPLEX 45 MIN: CPT

## 2024-03-10 PROCEDURE — 80048 BASIC METABOLIC PNL TOTAL CA: CPT

## 2024-03-10 PROCEDURE — APPSS30 APP SPLIT SHARED TIME 16-30 MINUTES: Performed by: NURSE PRACTITIONER

## 2024-03-10 PROCEDURE — 85025 COMPLETE CBC W/AUTO DIFF WBC: CPT

## 2024-03-10 PROCEDURE — 84484 ASSAY OF TROPONIN QUANT: CPT

## 2024-03-10 PROCEDURE — 99232 SBSQ HOSP IP/OBS MODERATE 35: CPT | Performed by: PSYCHIATRY & NEUROLOGY

## 2024-03-10 PROCEDURE — 2060000000 HC ICU INTERMEDIATE R&B

## 2024-03-10 PROCEDURE — 36415 COLL VENOUS BLD VENIPUNCTURE: CPT

## 2024-03-10 PROCEDURE — 6370000000 HC RX 637 (ALT 250 FOR IP): Performed by: PSYCHIATRY & NEUROLOGY

## 2024-03-10 PROCEDURE — 6360000002 HC RX W HCPCS: Performed by: INTERNAL MEDICINE

## 2024-03-10 PROCEDURE — 83735 ASSAY OF MAGNESIUM: CPT

## 2024-03-10 PROCEDURE — 99232 SBSQ HOSP IP/OBS MODERATE 35: CPT | Performed by: INTERNAL MEDICINE

## 2024-03-10 RX ORDER — METOPROLOL TARTRATE 1 MG/ML
5 INJECTION, SOLUTION INTRAVENOUS EVERY 5 MIN PRN
Status: CANCELLED | OUTPATIENT
Start: 2024-03-10 | End: 2024-03-10

## 2024-03-10 RX ORDER — ALBUTEROL SULFATE 90 UG/1
2 AEROSOL, METERED RESPIRATORY (INHALATION) PRN
Status: CANCELLED | OUTPATIENT
Start: 2024-03-10 | End: 2024-03-10

## 2024-03-10 RX ORDER — REGADENOSON 0.08 MG/ML
0.4 INJECTION, SOLUTION INTRAVENOUS
Status: CANCELLED | OUTPATIENT
Start: 2024-03-10

## 2024-03-10 RX ORDER — AMINOPHYLLINE 25 MG/ML
50 INJECTION, SOLUTION INTRAVENOUS PRN
Status: CANCELLED | OUTPATIENT
Start: 2024-03-10 | End: 2024-03-10

## 2024-03-10 RX ORDER — ATROPINE SULFATE 0.1 MG/ML
0.5 INJECTION INTRAVENOUS EVERY 5 MIN PRN
Status: CANCELLED | OUTPATIENT
Start: 2024-03-10 | End: 2024-03-10

## 2024-03-10 RX ORDER — RISPERIDONE 1 MG/1
1 TABLET ORAL DAILY
Status: DISCONTINUED | OUTPATIENT
Start: 2024-03-10 | End: 2024-03-12 | Stop reason: HOSPADM

## 2024-03-10 RX ORDER — SODIUM CHLORIDE 0.9 % (FLUSH) 0.9 %
5-40 SYRINGE (ML) INJECTION PRN
Status: CANCELLED | OUTPATIENT
Start: 2024-03-10 | End: 2024-03-10

## 2024-03-10 RX ORDER — SODIUM CHLORIDE 9 MG/ML
500 INJECTION, SOLUTION INTRAVENOUS CONTINUOUS PRN
Status: CANCELLED | OUTPATIENT
Start: 2024-03-10 | End: 2024-03-10

## 2024-03-10 RX ORDER — NITROGLYCERIN 0.4 MG/1
0.4 TABLET SUBLINGUAL EVERY 5 MIN PRN
Status: CANCELLED | OUTPATIENT
Start: 2024-03-10 | End: 2024-03-10

## 2024-03-10 RX ADMIN — CEFTRIAXONE SODIUM 1000 MG: 1 INJECTION, POWDER, FOR SOLUTION INTRAMUSCULAR; INTRAVENOUS at 18:05

## 2024-03-10 RX ADMIN — ASPIRIN 81 MG: 81 TABLET, CHEWABLE ORAL at 09:06

## 2024-03-10 RX ADMIN — POTASSIUM CHLORIDE: 2 INJECTION, SOLUTION, CONCENTRATE INTRAVENOUS at 09:20

## 2024-03-10 RX ADMIN — METOPROLOL TARTRATE 25 MG: 25 TABLET, FILM COATED ORAL at 09:06

## 2024-03-10 RX ADMIN — SODIUM CHLORIDE: 9 INJECTION, SOLUTION INTRAVENOUS at 20:00

## 2024-03-10 RX ADMIN — APIXABAN 5 MG: 5 TABLET, FILM COATED ORAL at 20:07

## 2024-03-10 RX ADMIN — RISPERIDONE 1 MG: 1 TABLET, FILM COATED ORAL at 15:44

## 2024-03-10 RX ADMIN — METOPROLOL TARTRATE 25 MG: 25 TABLET, FILM COATED ORAL at 20:07

## 2024-03-10 RX ADMIN — APIXABAN 5 MG: 5 TABLET, FILM COATED ORAL at 15:26

## 2024-03-10 RX ADMIN — SODIUM CHLORIDE, PRESERVATIVE FREE 10 ML: 5 INJECTION INTRAVENOUS at 09:18

## 2024-03-10 RX ADMIN — ATORVASTATIN CALCIUM 40 MG: 40 TABLET, FILM COATED ORAL at 20:07

## 2024-03-10 ASSESSMENT — PAIN SCALES - GENERAL: PAINLEVEL_OUTOF10: 0

## 2024-03-10 NOTE — PROGRESS NOTES
[facility]    Date: 3/10/2024  Patient Name: Leila Murphy        : 1960       [x] Pt Refusal     needs eval (balance tx to toilet unsteady per aide and pt agrees) pt declined  at 8:33 am.  Per MD coming out of pt room pt not in good place mentally this am.       [] Pt Unavailable due to:            Simona Retana, OT,    Date: 3/10/2024
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
## 2024-03-10 NOTE — PROGRESS NOTES
Goal 2: set up grooming with good cognition for task  Short Term Goal 3: min LE bathe and dress and toileting  Short Term Goal 4: min adl transfers  Short Term Goal 5: michelle 4-6 min stand, amb (with device that PT determines) with min assist during adls  Additional Goals?: Yes  Short Term Goal 6: michelle 10 reps x 2 BUE shld, elbow ex with min resistance on L and mod on R  (prioritize LUE if pt unable to complete both) for strengthening for adls.  Short Term Goal 7: if pt's brother or sister present : provide ed re 1. tub bench which is recommended    2. assist level currently needed for balance ex and 3. provide HEP for UE AROM and strengthening with emphasis on LUE.    Plan  Occupational Therapy Plan  Times Per Week: 5-6  Times Per Day: Once a day  Current Treatment Recommendations: Strengthening, ROM, Balance training, Functional mobility training, Endurance training, Safety education & training, Patient/Caregiver education & training, Equipment evaluation, education, & procurement, Self-Care / ADL, Cognitive/Perceptual training      OT Individual Minutes  OT Individual Minutes  Time In: 1116  Time Out: 1159  Minutes: 43           Electronically signed by Simona Retana OT on 3/10/24 at 2:24 PM EDT

## 2024-03-10 NOTE — PROGRESS NOTES
03/10/24 1127   Encounter Summary   Encounter Overview/Reason   Encounter   Service Provided For: Patient   Referral/Consult From: Rounding   Last Encounter  03/10/24   Complexity of Encounter Low   Spiritual/Emotional needs   Type Spiritual Support   Rituals, Rites and Sacraments   Type Sacrament of Sick  (Fr Stout 3-10-24)

## 2024-03-10 NOTE — PROGRESS NOTES
03/08/2024 189  150 - 450 k/uL Final    MPV 03/08/2024 10.4  6.0 - 12.0 fL Final    Neutrophils % 03/08/2024 84 (H)  36 - 66 % Final    Lymphocytes % 03/08/2024 6 (L)  24 - 44 % Final    Monocytes % 03/08/2024 10 (H)  1 - 7 % Final    Eosinophils % 03/08/2024 0  0 - 4 % Final    Basophils % 03/08/2024 0  0 - 2 % Final    Neutrophils Absolute 03/08/2024 10.00 (H)  1.3 - 9.1 k/uL Final    Lymphocytes Absolute 03/08/2024 0.70 (L)  1.0 - 4.8 k/uL Final    Monocytes Absolute 03/08/2024 1.20  0.1 - 1.3 k/uL Final    Eosinophils Absolute 03/08/2024 0.00  0.0 - 0.4 k/uL Final    Basophils Absolute 03/08/2024 0.00  0.0 - 0.2 k/uL Final    Sodium 03/08/2024 142  135 - 144 mmol/L Final    Potassium 03/08/2024 3.8  3.7 - 5.3 mmol/L Final    Chloride 03/08/2024 102  98 - 107 mmol/L Final    CO2 03/08/2024 24  20 - 31 mmol/L Final    Anion Gap 03/08/2024 16  9 - 17 mmol/L Final    Glucose 03/08/2024 190 (H)  70 - 99 mg/dL Final    BUN 03/08/2024 13  8 - 23 mg/dL Final    Creatinine 03/08/2024 0.9  0.5 - 0.9 mg/dL Final    Est, Glom Filt Rate 03/08/2024 >60  >60 mL/min/1.73m2 Final    Comment:       These results are not intended for use in patients <18 years of age.        eGFR results are calculated without a race factor using the 2021 CKD-EPI equation.  Careful clinical correlation is recommended, particularly when comparing to results   calculated using previous equations.  The CKD-EPI equation is less accurate in patients with extremes of muscle mass, extra-renal   metabolism of creatine, excessive creatine ingestion, or following therapy that affects   renal tubular secretion.      Calcium 03/08/2024 8.7  8.6 - 10.4 mg/dL Final    Magnesium 03/08/2024 1.7  1.6 - 2.6 mg/dL Final    Troponin, High Sensitivity 03/08/2024 22 (H)  0 - 14 ng/L Final    High Sensitivity Troponin values cannot be compared with other Troponin methodologies.    Troponin, High Sensitivity 03/08/2024 29 (H)  0 - 14 ng/L Final    High Sensitivity  Troponin values cannot be compared with other Troponin methodologies.    Body Surface Area 03/08/2024 1.7  m2 Final    AV Peak Velocity 03/08/2024 0.8  m/s Final    AV Peak Gradient 03/08/2024 3  mmHg Final    AV Area by Peak Velocity 03/08/2024 2.9  cm2 Final    Aortic Root 03/08/2024 2.8  cm Final    IVC Proxmal 03/08/2024 1.9  cm Final    IVSd 03/08/2024 1.1 (A)  0.6 - 0.9 cm Final    LVIDd 03/08/2024 3.8 (A)  3.9 - 5.3 cm Final    LVIDs 03/08/2024 3.5  cm Final    LVOT Diameter 03/08/2024 1.8  cm Final    LVOT Mean Gradient 03/08/2024 2  mmHg Final    LVOT VTI 03/08/2024 13.6  cm Final    LVOT Peak Velocity 03/08/2024 1.0  m/s Final    LVOT Peak Gradient 03/08/2024 4  mmHg Final    LVPWd 03/08/2024 1.2 (A)  0.6 - 0.9 cm Final    LVOT Area 03/08/2024 2.5  cm2 Final    LVOT SV 03/08/2024 34.6  ml Final    LA Diameter 03/08/2024 3.4  cm Final    MV Area by Planimetry 03/08/2024 11.2  cm2 Final    MV Area by Planimetry 03/08/2024 11.2  cm2 Final    PV Max Velocity 03/08/2024 1.0  m/s Final    PV Peak Gradient 03/08/2024 4  mmHg Final    RV Basal Dimension 03/08/2024 3.7  cm Final    TR Max Velocity 03/08/2024 3.04  m/s Final    TR Peak Gradient 03/08/2024 37  mmHg Final    Fractional Shortening 2D 03/08/2024 8  28 - 44 % Final    LVIDd Index 03/08/2024 2.29  cm/m2 Final    LVIDs Index 03/08/2024 2.11  cm/m2 Final    LV RWT Ratio 03/08/2024 0.63   Final    LV Mass 2D 03/08/2024 143.8  67 - 162 g Final    LV Mass 2D Index 03/08/2024 86.6  43 - 95 g/m2 Final    LVOT Stroke Volume Index 03/08/2024 20.8  mL/m2 Final    LA Size Index 03/08/2024 2.05  cm/m2 Final    LA/AO Root Ratio 03/08/2024 1.21   Final    Ao Root Index 03/08/2024 1.69  cm/m2 Final    AV Velocity Ratio 03/08/2024 1.25   Final    DEVENDRA/BSA Peak Velocity 03/08/2024 1.7  cm2/m2 Final    Est. RA Pressure 03/08/2024 3  mmHg Final    RVSP 03/08/2024 40  mmHg Final    EF Physician 03/08/2024 40  % Final    Specimen Description 03/08/2024 .CLEAN CATCH URINE

## 2024-03-10 NOTE — PROGRESS NOTES
Patient is refusing to take lovenox injections. Attempted to educate patient on importance of getting medication. She still refuses. Writer does not think she understands.

## 2024-03-10 NOTE — PROGRESS NOTES
Writer at bedside with cardiology. We are discontinuing Lovenox as patient is refusing injections and adding Eliquis 5mg BID. Cardiology is putting a hold on wanting cardiac cath at this point and orders for Libby scan will be placed. Writer to place orders.

## 2024-03-10 NOTE — CARE COORDINATION
DISCHARGE PLANNING NOTE:    The patient lacks capacity, per psych, to make decisions. Resident team contacted this writer to discern if there was a HCPOA or guardian in place. We have no record of this information.     This writer contacted the patient's son, Cezar (legal next of kin per [place] Law), via telephone, however the line rings once and goes to a busy signal with no option for voicemail.     At this time, sister, Valentina, contacted to see if we had the correct contact information and she states that we do, and that he works nights and may have his phone shut off. This writer asked if the patient had any other children and per Valentina she has an older daughter, Iqra whom lives in [place]. Iqra's contact information is [phone].     Resident team updated on the patient's legal next of kin and contact information. With regard to decision making, per [place] Law both children would have to be in agreement.    Electronically signed by Mely Martin RN on 3/10/2024 at 10:21 AM

## 2024-03-10 NOTE — PLAN OF CARE
Problem: Discharge Planning  Goal: Discharge to home or other facility with appropriate resources  3/10/2024 0406 by Brittani Mueller RN  Outcome: Progressing       Problem: Pain  Goal: Verbalizes/displays adequate comfort level or baseline comfort level  3/10/2024 0406 by Brittani Mueller RN  Outcome: Progressing       Problem: ABCDS Injury Assessment  Goal: Absence of physical injury  3/10/2024 0406 by Brittani Mueller RN  Outcome: Progressing       Problem: Safety - Adult  Goal: Free from fall injury  3/10/2024 0406 by Brittani Mueller RN  Outcome: Progressing       Problem: Cardiovascular - Adult  Goal: Absence of cardiac dysrhythmias or at baseline  3/10/2024 0406 by Brittani Mueller RN  Outcome: Progressing       Problem: Genitourinary - Adult  Goal: Absence of urinary retention  3/10/2024 0406 by Brittani Mueller RN  Outcome: Progressing    Problem: Infection - Adult  Goal: Absence of infection at discharge  3/10/2024 0406 by Brittani Mueller RN  Outcome: Progressing       Problem: Skin/Tissue Integrity  Goal: Absence of new skin breakdown  Description: 1.  Monitor for areas of redness and/or skin breakdown  2.  Assess vascular access sites hourly  3.  Every 4-6 hours minimum:  Change oxygen saturation probe site  4.  Every 4-6 hours:  If on nasal continuous positive airway pressure, respiratory therapy assess nares and determine need for appliance change or resting period.  Outcome: Progressing

## 2024-03-10 NOTE — PLAN OF CARE
Problem: Discharge Planning  Goal: Discharge to home or other facility with appropriate resources  Outcome: Progressing     Problem: Pain  Goal: Verbalizes/displays adequate comfort level or baseline comfort level  Outcome: Progressing     Problem: ABCDS Injury Assessment  Goal: Absence of physical injury  Outcome: Progressing     Problem: Safety - Adult  Goal: Free from fall injury  Outcome: Progressing     Problem: Cardiovascular - Adult  Goal: Absence of cardiac dysrhythmias or at baseline  Outcome: Progressing     Problem: Genitourinary - Adult  Goal: Absence of urinary retention  Outcome: Progressing     Problem: Infection - Adult  Goal: Absence of infection at discharge  Outcome: Progressing     Problem: Skin/Tissue Integrity  Goal: Absence of new skin breakdown  Description: 1.  Monitor for areas of redness and/or skin breakdown  2.  Assess vascular access sites hourly  3.  Every 4-6 hours minimum:  Change oxygen saturation probe site  4.  Every 4-6 hours:  If on nasal continuous positive airway pressure, respiratory therapy assess nares and determine need for appliance change or resting period.  Outcome: Progressing

## 2024-03-10 NOTE — PROGRESS NOTES
[facility]   Progress Note                   Date:   3/10/2024  Patient name: Leila Murphy  Date of admission:  3/8/2024 11:24 AM  MRN:   [id number]  YOB: 1960  PCP: Tye Monroy MD    Reason for Admission:      Subjective:       Clinical Changes / Abnormalities:  Usual state denies any symptoms  Patient is still in normal sinus rhythm  Patient is refusing to get Lovenox        Medications:   Scheduled Meds:   potassium chloride 60 mEq in sodium chloride 0.9 % 1,000 mL IVPB   IntraVENous Once    metoprolol tartrate  25 mg Oral BID    aspirin  81 mg Oral Daily    enoxaparin  1 mg/kg SubCUTAneous BID    atorvastatin  40 mg Oral Nightly    sodium chloride flush  5-40 mL IntraVENous 2 times per day    cefTRIAXone (ROCEPHIN) IV  1,000 mg IntraVENous Q24H     Continuous Infusions:   sodium chloride 5 mL/hr at 03/09/24 0142    sodium chloride 50 mL/hr at 03/08/24 2028     CBC:   Recent Labs     03/08/24  1225 03/09/24  0737 03/10/24  0713   WBC 11.9* 7.1 5.4   HGB 13.6 12.8 13.1    169 188     BMP:    Recent Labs     03/08/24  1225 03/09/24  0737 03/10/24  0713    139 141   K 3.8 3.0* 2.9*    104 102   CO2 24 26 26   BUN 13 11 7*   CREATININE 0.9 0.7 0.6   GLUCOSE 190* 117* 102*     Hepatic: No results for input(s): \"AST\", \"ALT\", \"ALB\", \"BILITOT\", \"ALKPHOS\" in the last 72 hours.  Troponin: No results for input(s): \"TROPONINI\" in the last 72 hours.  BNP: No results for input(s): \"BNP\" in the last 72 hours.  Lipids: No results for input(s): \"CHOL\", \"HDL\" in the last 72 hours.    Invalid input(s): \"LDLCALCU\"  INR:   Recent Labs     03/08/24  1733   INR 1.1       Objective:   Vitals: BP (!) 156/80   Pulse 77   Temp 97.7 °F (36.5 °C) (Oral)   Resp 22   Ht 1.524 m (5')   Wt 73 kg (160 lb 15 oz)   SpO2 93%   BMI 31.43 kg/m²   I/O last 3 completed shifts:  In: 3549.9 [P.O.:120; I.V.:604.9; IV Piggyback:2825]  Out: 3050 [Urine:3050]  I/O this shift:  In: -   Out:        Patient Active Problem List:     Chest pain     Paranoid reaction, acute ([facility])     Acute exacerbation of chronic paranoid schizophrenia ([facility])     Paranoid schizophrenia ([facility])     Pre-syncope     Severe sepsis with acute organ dysfunction ([facility])     Atrial fibrillation with RVR ([facility])     NSTEMI (non-ST elevated myocardial infarction) ([facility])     Cardiomyopathy ([facility])      Plan of Treatment:   Paroxysmal A-fib converted to normal sinus rhythm off amiodarone drip  Patient is on beta-blocker and Lovenox  Patient refusing Lovenox  At this time we will start on Eliquis 5 mg twice daily  Elevated tropes, 29-32-30 almost flat  At this time we will do a Lexiscan stress test in a.m. and further plan after the result  Mild cardiomyopathy EF 40%  No signs of any volume overload  Could be secondary to ischemia patient is getting stress test    Storm Sorensen MD, MD  [facility]  [phone]

## 2024-03-11 ENCOUNTER — HOSPITAL ENCOUNTER (INPATIENT)
Age: 64
Discharge: HOME | DRG: 201 | End: 2024-03-13
Attending: INTERNAL MEDICINE
Payer: MEDICAID

## 2024-03-11 ENCOUNTER — APPOINTMENT (OUTPATIENT)
Dept: NUCLEAR MEDICINE | Age: 64
DRG: 201 | End: 2024-03-11
Attending: INTERNAL MEDICINE
Payer: MEDICAID

## 2024-03-11 LAB
ANION GAP SERPL CALCULATED.3IONS-SCNC: 10 MMOL/L (ref 9–17)
ANION GAP SERPL CALCULATED.3IONS-SCNC: 11 MMOL/L (ref 9–17)
BASOPHILS # BLD: 0 K/UL (ref 0–0.2)
BASOPHILS # BLD: 0 K/UL (ref 0–0.2)
BASOPHILS NFR BLD: 1 % (ref 0–2)
BASOPHILS NFR BLD: 1 % (ref 0–2)
BUN SERPL-MCNC: 4 MG/DL (ref 8–23)
BUN SERPL-MCNC: 4 MG/DL (ref 8–23)
CALCIUM SERPL-MCNC: 8.9 MG/DL (ref 8.6–10.4)
CALCIUM SERPL-MCNC: 8.9 MG/DL (ref 8.6–10.4)
CHLORIDE SERPL-SCNC: 104 MMOL/L (ref 98–107)
CHLORIDE SERPL-SCNC: 105 MMOL/L (ref 98–107)
CO2 SERPL-SCNC: 27 MMOL/L (ref 20–31)
CO2 SERPL-SCNC: 28 MMOL/L (ref 20–31)
CREAT SERPL-MCNC: 0.5 MG/DL (ref 0.5–0.9)
CREAT SERPL-MCNC: 0.5 MG/DL (ref 0.5–0.9)
ECHO BSA: 1.7 M2
EOSINOPHIL # BLD: 0.2 K/UL (ref 0–0.4)
EOSINOPHIL # BLD: 0.2 K/UL (ref 0–0.4)
EOSINOPHILS RELATIVE PERCENT: 3 % (ref 0–4)
EOSINOPHILS RELATIVE PERCENT: 3 % (ref 0–4)
ERYTHROCYTE [DISTWIDTH] IN BLOOD BY AUTOMATED COUNT: 14.4 % (ref 11.5–14.9)
ERYTHROCYTE [DISTWIDTH] IN BLOOD BY AUTOMATED COUNT: 14.6 % (ref 11.5–14.9)
GFR SERPL CREATININE-BSD FRML MDRD: >60 ML/MIN/1.73M2
GFR SERPL CREATININE-BSD FRML MDRD: >60 ML/MIN/1.73M2
GLUCOSE SERPL-MCNC: 113 MG/DL (ref 70–99)
GLUCOSE SERPL-MCNC: 123 MG/DL (ref 70–99)
HCT VFR BLD AUTO: 40.9 % (ref 36–46)
HCT VFR BLD AUTO: 40.9 % (ref 36–46)
HGB BLD-MCNC: 12.8 G/DL (ref 12–16)
HGB BLD-MCNC: 12.9 G/DL (ref 12–16)
LYMPHOCYTES NFR BLD: 1.1 K/UL (ref 1–4.8)
LYMPHOCYTES NFR BLD: 1.4 K/UL (ref 1–4.8)
LYMPHOCYTES RELATIVE PERCENT: 20 % (ref 24–44)
LYMPHOCYTES RELATIVE PERCENT: 25 % (ref 24–44)
MAGNESIUM SERPL-MCNC: 1.5 MG/DL (ref 1.6–2.6)
MAGNESIUM SERPL-MCNC: 1.6 MG/DL (ref 1.6–2.6)
MCH RBC QN AUTO: 29 PG (ref 26–34)
MCH RBC QN AUTO: 29.5 PG (ref 26–34)
MCHC RBC AUTO-ENTMCNC: 31.4 G/DL (ref 31–37)
MCHC RBC AUTO-ENTMCNC: 31.7 G/DL (ref 31–37)
MCV RBC AUTO: 92.3 FL (ref 80–100)
MCV RBC AUTO: 93.1 FL (ref 80–100)
MICROORGANISM SPEC CULT: NORMAL
MICROORGANISM SPEC CULT: NORMAL
MONOCYTES NFR BLD: 0.6 K/UL (ref 0.1–1.3)
MONOCYTES NFR BLD: 0.7 K/UL (ref 0.1–1.3)
MONOCYTES NFR BLD: 10 % (ref 1–7)
MONOCYTES NFR BLD: 12 % (ref 1–7)
NEUTROPHILS NFR BLD: 61 % (ref 36–66)
NEUTROPHILS NFR BLD: 64 % (ref 36–66)
NEUTS SEG NFR BLD: 3.4 K/UL (ref 1.3–9.1)
NEUTS SEG NFR BLD: 3.6 K/UL (ref 1.3–9.1)
NUC STRESS EJECTION FRACTION: 61 %
PLATELET # BLD AUTO: 203 K/UL (ref 150–450)
PLATELET # BLD AUTO: 219 K/UL (ref 150–450)
PMV BLD AUTO: 10 FL (ref 6–12)
PMV BLD AUTO: 9.4 FL (ref 6–12)
POTASSIUM SERPL-SCNC: 3.4 MMOL/L (ref 3.7–5.3)
POTASSIUM SERPL-SCNC: 3.5 MMOL/L (ref 3.7–5.3)
RBC # BLD AUTO: 4.39 M/UL (ref 4–5.2)
RBC # BLD AUTO: 4.43 M/UL (ref 4–5.2)
SERVICE CMNT-IMP: NORMAL
SERVICE CMNT-IMP: NORMAL
SODIUM SERPL-SCNC: 142 MMOL/L (ref 135–144)
SODIUM SERPL-SCNC: 143 MMOL/L (ref 135–144)
SPECIMEN DESCRIPTION: NORMAL
SPECIMEN DESCRIPTION: NORMAL
STRESS BASELINE DIAS BP: 69 MMHG
STRESS BASELINE HR: 73 BPM
STRESS BASELINE ST DEPRESSION: 0 MM
STRESS BASELINE SYS BP: 122 MMHG
STRESS ESTIMATED WORKLOAD: 1 METS
STRESS PEAK DIAS BP: 67 MMHG
STRESS PEAK SYS BP: 129 MMHG
STRESS PERCENT HR ACHIEVED: 69 %
STRESS POST PEAK HR: 109 BPM
STRESS RATE PRESSURE PRODUCT: NORMAL BPM*MMHG
STRESS STAGE RECOVERY 1 BP: NORMAL MMHG
STRESS STAGE RECOVERY 1 DURATION: 1 MIN:SEC
STRESS STAGE RECOVERY 1 HR: 109 BPM
STRESS STAGE RECOVERY 2 BP: NORMAL MMHG
STRESS STAGE RECOVERY 2 DURATION: 6 MIN:SEC
STRESS STAGE RECOVERY 2 HR: 103 BPM
STRESS TARGET HR: 159 BPM
TID: 0.56
TROPONIN I SERPL HS-MCNC: 17 NG/L (ref 0–14)
WBC OTHER # BLD: 5.5 K/UL (ref 3.5–11)
WBC OTHER # BLD: 5.6 K/UL (ref 3.5–11)

## 2024-03-11 PROCEDURE — 2060000000 HC ICU INTERMEDIATE R&B

## 2024-03-11 PROCEDURE — 93017 CV STRESS TEST TRACING ONLY: CPT

## 2024-03-11 PROCEDURE — 93016 CV STRESS TEST SUPVJ ONLY: CPT | Performed by: RADIOLOGY

## 2024-03-11 PROCEDURE — 84484 ASSAY OF TROPONIN QUANT: CPT

## 2024-03-11 PROCEDURE — 83735 ASSAY OF MAGNESIUM: CPT

## 2024-03-11 PROCEDURE — 6370000000 HC RX 637 (ALT 250 FOR IP): Performed by: INTERNAL MEDICINE

## 2024-03-11 PROCEDURE — 99232 SBSQ HOSP IP/OBS MODERATE 35: CPT | Performed by: INTERNAL MEDICINE

## 2024-03-11 PROCEDURE — 2500000003 HC RX 250 WO HCPCS

## 2024-03-11 PROCEDURE — 6370000000 HC RX 637 (ALT 250 FOR IP): Performed by: PSYCHIATRY & NEUROLOGY

## 2024-03-11 PROCEDURE — 2580000003 HC RX 258: Performed by: INTERNAL MEDICINE

## 2024-03-11 PROCEDURE — 36415 COLL VENOUS BLD VENIPUNCTURE: CPT

## 2024-03-11 PROCEDURE — 6370000000 HC RX 637 (ALT 250 FOR IP)

## 2024-03-11 PROCEDURE — 85025 COMPLETE CBC W/AUTO DIFF WBC: CPT

## 2024-03-11 PROCEDURE — 80048 BASIC METABOLIC PNL TOTAL CA: CPT

## 2024-03-11 PROCEDURE — 6360000002 HC RX W HCPCS

## 2024-03-11 PROCEDURE — 97535 SELF CARE MNGMENT TRAINING: CPT

## 2024-03-11 PROCEDURE — 93005 ELECTROCARDIOGRAM TRACING: CPT

## 2024-03-11 PROCEDURE — 3430000000 HC RX DIAGNOSTIC RADIOPHARMACEUTICAL: Performed by: INTERNAL MEDICINE

## 2024-03-11 PROCEDURE — A9500 TC99M SESTAMIBI: HCPCS | Performed by: INTERNAL MEDICINE

## 2024-03-11 PROCEDURE — 99233 SBSQ HOSP IP/OBS HIGH 50: CPT | Performed by: NURSE PRACTITIONER

## 2024-03-11 PROCEDURE — 6360000002 HC RX W HCPCS: Performed by: INTERNAL MEDICINE

## 2024-03-11 PROCEDURE — 99232 SBSQ HOSP IP/OBS MODERATE 35: CPT | Performed by: PSYCHIATRY & NEUROLOGY

## 2024-03-11 PROCEDURE — 78452 HT MUSCLE IMAGE SPECT MULT: CPT

## 2024-03-11 PROCEDURE — 2580000003 HC RX 258

## 2024-03-11 PROCEDURE — 97530 THERAPEUTIC ACTIVITIES: CPT

## 2024-03-11 RX ORDER — METOPROLOL TARTRATE 1 MG/ML
5 INJECTION, SOLUTION INTRAVENOUS EVERY 5 MIN PRN
Status: ACTIVE | OUTPATIENT
Start: 2024-03-11 | End: 2024-03-11

## 2024-03-11 RX ORDER — TETRAKIS(2-METHOXYISOBUTYLISOCYANIDE)COPPER(I) TETRAFLUOROBORATE 1 MG/ML
35 INJECTION, POWDER, LYOPHILIZED, FOR SOLUTION INTRAVENOUS
Status: COMPLETED | OUTPATIENT
Start: 2024-03-11 | End: 2024-03-11

## 2024-03-11 RX ORDER — AMINOPHYLLINE 25 MG/ML
50 INJECTION, SOLUTION INTRAVENOUS PRN
Status: ACTIVE | OUTPATIENT
Start: 2024-03-11 | End: 2024-03-11

## 2024-03-11 RX ORDER — ALBUTEROL SULFATE 90 UG/1
2 AEROSOL, METERED RESPIRATORY (INHALATION) PRN
Status: ACTIVE | OUTPATIENT
Start: 2024-03-11 | End: 2024-03-11

## 2024-03-11 RX ORDER — POTASSIUM CHLORIDE 20 MEQ/1
60 TABLET, EXTENDED RELEASE ORAL ONCE
Status: DISCONTINUED | OUTPATIENT
Start: 2024-03-11 | End: 2024-03-11

## 2024-03-11 RX ORDER — ATROPINE SULFATE 0.1 MG/ML
0.5 INJECTION INTRAVENOUS EVERY 5 MIN PRN
Status: ACTIVE | OUTPATIENT
Start: 2024-03-11 | End: 2024-03-11

## 2024-03-11 RX ORDER — SODIUM CHLORIDE 0.9 % (FLUSH) 0.9 %
10 SYRINGE (ML) INJECTION PRN
Status: DISCONTINUED | OUTPATIENT
Start: 2024-03-11 | End: 2024-03-12 | Stop reason: HOSPADM

## 2024-03-11 RX ORDER — REGADENOSON 0.08 MG/ML
0.4 INJECTION, SOLUTION INTRAVENOUS
Status: COMPLETED | OUTPATIENT
Start: 2024-03-11 | End: 2024-03-11

## 2024-03-11 RX ORDER — POTASSIUM CHLORIDE 20 MEQ/1
40 TABLET, EXTENDED RELEASE ORAL ONCE
Status: COMPLETED | OUTPATIENT
Start: 2024-03-11 | End: 2024-03-11

## 2024-03-11 RX ORDER — POTASSIUM CHLORIDE 20 MEQ/1
20 TABLET, EXTENDED RELEASE ORAL ONCE
Status: DISCONTINUED | OUTPATIENT
Start: 2024-03-11 | End: 2024-03-12 | Stop reason: HOSPADM

## 2024-03-11 RX ORDER — TETRAKIS(2-METHOXYISOBUTYLISOCYANIDE)COPPER(I) TETRAFLUOROBORATE 1 MG/ML
15 INJECTION, POWDER, LYOPHILIZED, FOR SOLUTION INTRAVENOUS
Status: COMPLETED | OUTPATIENT
Start: 2024-03-11 | End: 2024-03-11

## 2024-03-11 RX ORDER — SODIUM CHLORIDE 9 MG/ML
500 INJECTION, SOLUTION INTRAVENOUS CONTINUOUS PRN
Status: ACTIVE | OUTPATIENT
Start: 2024-03-11 | End: 2024-03-11

## 2024-03-11 RX ORDER — NITROGLYCERIN 0.4 MG/1
0.4 TABLET SUBLINGUAL EVERY 5 MIN PRN
Status: ACTIVE | OUTPATIENT
Start: 2024-03-11 | End: 2024-03-11

## 2024-03-11 RX ORDER — SODIUM CHLORIDE 0.9 % (FLUSH) 0.9 %
5-40 SYRINGE (ML) INJECTION PRN
Status: ACTIVE | OUTPATIENT
Start: 2024-03-11 | End: 2024-03-11

## 2024-03-11 RX ORDER — POTASSIUM CHLORIDE 7.45 MG/ML
10 INJECTION INTRAVENOUS
Status: ACTIVE | OUTPATIENT
Start: 2024-03-11 | End: 2024-03-11

## 2024-03-11 RX ADMIN — METOPROLOL TARTRATE 25 MG: 25 TABLET, FILM COATED ORAL at 23:12

## 2024-03-11 RX ADMIN — Medication 12.9 MILLICURIE: at 11:10

## 2024-03-11 RX ADMIN — SODIUM CHLORIDE, PRESERVATIVE FREE 10 ML: 5 INJECTION INTRAVENOUS at 13:58

## 2024-03-11 RX ADMIN — ATORVASTATIN CALCIUM 40 MG: 40 TABLET, FILM COATED ORAL at 20:27

## 2024-03-11 RX ADMIN — REGADENOSON 0.4 MG: 0.08 INJECTION, SOLUTION INTRAVENOUS at 11:08

## 2024-03-11 RX ADMIN — METOPROLOL TARTRATE 25 MG: 25 TABLET, FILM COATED ORAL at 12:36

## 2024-03-11 RX ADMIN — MAGNESIUM SULFATE HEPTAHYDRATE 2000 MG: 40 INJECTION, SOLUTION INTRAVENOUS at 22:00

## 2024-03-11 RX ADMIN — METOPROLOL TARTRATE 25 MG: 25 TABLET, FILM COATED ORAL at 20:27

## 2024-03-11 RX ADMIN — POTASSIUM CHLORIDE 40 MEQ: 1500 TABLET, EXTENDED RELEASE ORAL at 19:28

## 2024-03-11 RX ADMIN — Medication 32.5 MILLICURIE: at 13:57

## 2024-03-11 RX ADMIN — RISPERIDONE 1 MG: 1 TABLET, FILM COATED ORAL at 12:36

## 2024-03-11 RX ADMIN — CEFTRIAXONE SODIUM 1000 MG: 1 INJECTION, POWDER, FOR SOLUTION INTRAMUSCULAR; INTRAVENOUS at 19:32

## 2024-03-11 RX ADMIN — ASPIRIN 81 MG: 81 TABLET, CHEWABLE ORAL at 12:37

## 2024-03-11 ASSESSMENT — ENCOUNTER SYMPTOMS
GASTROINTESTINAL NEGATIVE: 1
SHORTNESS OF BREATH: 0

## 2024-03-11 ASSESSMENT — PAIN SCALES - GENERAL: PAINLEVEL_OUTOF10: 0

## 2024-03-11 NOTE — PROGRESS NOTES
Pt back in room from stress test. Assessment done. Pt was off the floor this morning when RN took over from the RN that was her original nurse this morning

## 2024-03-11 NOTE — PLAN OF CARE
Problem: Discharge Planning  Goal: Discharge to home or other facility with appropriate resources  3/11/2024 0426 by Brittani Mueller RN  Outcome: Progressing       Problem: Pain  Goal: Verbalizes/displays adequate comfort level or baseline comfort level  3/11/2024 0426 by Brittani Mueller RN  Outcome: Progressing    Problem: ABCDS Injury Assessment  Goal: Absence of physical injury  3/11/2024 0426 by Brittani Mueller RN  Outcome: Progressing       Problem: Safety - Adult  Goal: Free from fall injury  3/11/2024 0426 by Brittani Mueller RN  Outcome: Progressing       Problem: Cardiovascular - Adult  Goal: Absence of cardiac dysrhythmias or at baseline  3/11/2024 0426 by Brittani Mueller RN  Outcome: Progressing       Problem: Genitourinary - Adult  Goal: Absence of urinary retention  3/11/2024 0426 by Brittani Mueller RN  Outcome: Progressing    Problem: Infection - Adult  Goal: Absence of infection at discharge  3/11/2024 0426 by Brittani Mueller RN  Outcome: Progressing    Problem: Skin/Tissue Integrity  Goal: Absence of new skin breakdown  Description: 1.  Monitor for areas of redness and/or skin breakdown  2.  Assess vascular access sites hourly  3.  Every 4-6 hours minimum:  Change oxygen saturation probe site  4.  Every 4-6 hours:  If on nasal continuous positive airway pressure, respiratory therapy assess nares and determine need for appliance change or resting period.  3/11/2024 0426 by Brittani Mueller RN  Outcome: Progressing

## 2024-03-11 NOTE — PROGRESS NOTES
[facility]  IN-PATIENT SERVICE  [facility]    PROGRESS NOTE             3/11/2024    7:39 AM    Name:   Leila Murphy  MRN:     [id number]     Acct:      [id number]   Room:   2113/2113-01   Day:  3  Admit Date:  3/8/2024 11:24 AM    PCP:  Tye Monroy MD  Code Status:  Full Code    Subjective:     C/C: No chief complaint on file.    Interval History Status: improved.    Patient seen and examined at bedside this morning.  TeleSitter in place.  Patient lying in bed, blunted affect, responding to questions today.  States that she does not have any chest pain or shortness of breath.  States that she is doing okay.    Had stress test this morning, awaiting results.        Brief History:     63-year-old female known case of schizophrenia with paranoia, recently admitted at [facility] (discharged yesterday) chest pain and altered mental status.  Patient at that admission did have nonspecific EKG changes with normal tropes, was evaluated by cardiology who recommended a Lexiscan stress test but patient refused.  Patient was discharged to be readmitted at [facility].  Upon transfer to [facility] patient had an episode of presyncope, rapid response was called patient's blood sugar remained normal.  Patient was found to be hypotensive, patient moved to the floor.   Patient evaluated at bedside.  Patient had elevated heart rate in the 180s to 200s. Patient also hypotensive w/ blood pressure 97/57. Given 1L fluid bolus.  EKG done which showed patient in A-fib with RVR. Cardiology consulted.  Patient denies any chest pain, shortness of breath, lightheadedness, poor historian, difficult to obtain history.    Review of Systems:     Review of Systems   Constitutional:  Positive for diaphoresis. Negative for chills, fatigue and fever.   Respiratory:  Negative for shortness of breath.    Cardiovascular:  Negative for chest pain, palpitations and leg swelling.   Gastrointestinal: Negative.   reviewed the key elements of all parts of the encounter with the resident.  I agree with the assessment, plan and orders as documented by the resident.  A-fib RVR,  Non-ST elevation MI,  Cardiology on board, full dose Lovenox, cardiac meds,  Possible cath soon, patient will not be able to get the consent, will contact the family members, psych on board  Appreciate cardiology input,  Patient refusing Lovenox, changed to Eliquis,  Stress test today         Electronically signed by Romi Freire MD

## 2024-03-11 NOTE — PROGRESS NOTES
[facility]   INPATIENT OCCUPATIONAL THERAPY  PROGRESS NOTE  Date: 3/11/2024  Patient Name: Leila Murphy       Room: 3-  MRN: [id number]    : 1960  (63 y.o.)  Gender: female      Diagnosis: Severe sepsis with acute organ dysfunction    Atrial fibrillation with RVR - converted to normal sinus rhythm off amiodarone drip   NSTEMI (non-ST elevated myocardial infarction)    Cardiomyopathy  Acute exacerbation of chronic paranoid schizophrenia. Acute dehydration with lactic acidosis/high anion gap metabolic acidosis - pt not wanting to eat or drink at home recently      Discharge Recommendations:  Further Occupational Therapy is recommended upon facility discharge.    OT Equipment Recommendations  Equipment Needed: Yes  Other: tub bench recommended.  family has tub seat or bench that is in storage. may need toilet grab bars, reachers, call alert necklace. may need 24 hour care (does at this time- 3-10)    Restrictions/Precautions  Restrictions/Precautions  Restrictions/Precautions: General Precautions;Fall Risk (General Precautions; Fall Risk)    Subjective  Subjective  Subjective: \"I guess I can\" Pt is pleasant and agreeable to therapy.  Comments: [facility].    Objective  Cognition  Overall Cognitive Status: Exceptions  Arousal/Alertness: Inconsistent responses to stimuli (at times delayed at times appropriate)  Following Commands: Follows one step commands with increased time;Follows one step commands with repetition  Attention Span: Attends with cues to redirect  Memory: Decreased short term memory;Decreased recall of recent events;Decreased long term memory  Safety Judgement: Decreased awareness of need for assistance;Decreased awareness of need for safety  Problem Solving: Assistance required to generate solutions;Assistance required to implement solutions;Assistance required to identify errors made;Assistance required to correct errors made;Decreased awareness of  well  Activity Tolerance Comments: hx of low frustration tolerance  Assessment  Performance deficits / Impairments: Decreased functional mobility , Decreased endurance, Decreased ADL status, Decreased ROM, Decreased balance, Decreased strength, Decreased safe awareness, Decreased cognition, Decreased high-level IADLs  Prognosis: Fair  Decision Making: Medium Complexity  History: Severe sepsis with acute organ dysfunction  Atrial fibrillation with RVR - converted to normal sinus rhythm off amiodarone drip  NSTEMI (non-ST elevated myocardial infarction)  Cardiomyopathy Acute exacerbation of chronic paranoid schizophrenia  Exam: 9 performance deficits  Assistance / Modification: mod  OT Equipment Recommendations  Equipment Needed: Yes  Other: tub bench recommended.  family has tub seat or bench that is in storage. may need toilet grab bars, reachers, call alert necklace. may need 24 hour care (does at this time- 3-10)  Safety Devices  Type of Devices: Left in bed, Bed alarm in place, Call light within reach, Patient at risk for falls    AM-PAC Daily Activities Inpatient  AM-PAC Daily Activity - Inpatient   How much help is needed for putting on and taking off regular lower body clothing?: A Lot  How much help is needed for bathing (which includes washing, rinsing, drying)?: A Lot  How much help is needed for toileting (which includes using toilet, bedpan, or urinal)?: A Little  How much help is needed for putting on and taking off regular upper body clothing?: A Little  How much help is needed for taking care of personal grooming?: A Little  How much help for eating meals?: None  AM-PAC Inpatient Daily Activity Raw Score: 17  AM-PAC Inpatient ADL T-Scale Score : 37.26  ADL Inpatient CMS 0-100% Score: 50.11  ADL Inpatient CMS G-Code Modifier : CK       03/11/24 0900   OT Individual Minutes   Time In 0900   Time Out 0925   Minutes 25         Electronically signed by HARDIK Garcia on 3/11/24 at 12:14 PM EDT

## 2024-03-11 NOTE — CARE COORDINATION
ONGOING DISCHARGE PLAN:    Writer reviewed chart notes.     Pt. Lacks Capacity, per Psych, to make decisions.     Psych rec. Admission to [facility], when Medically cleared.     Pt. Has Stress test today. Awaiting results. Cardio on board.     Writer attempted to call Pt's Son, Cezar, [phone], Line rings once & then is busy.    Writer Left , for Pt's Daughter, Iqra, [phone], to Discuss DC plans/Needs. Writer is awaiting call back.     Remains on IV Rocephin.     Will continue to follow for additional discharge needs.    If patient is discharged prior to next notation, then this note serves as note for discharge by case management.    Electronically signed by Rosa Crouch RN on 3/11/2024 at 3:06 PM     ADD: Received return call from Iqra, & informed of Psych Rec. She is ok with the plan & will notify her Brother, Cezar, for he works nights & is probably asleep. Iqra, did give permission for her Aunt Valentina,[phone]. to help with decision making, for Iqra, lives out of town.

## 2024-03-11 NOTE — PROGRESS NOTES
Department of Psychiatry  Consult Progress Note  3/11/2024    Patient Name: Leila Murphy    Reason for initial consult:  psychiatric evaluation, psychosis          Interval History:      The patient was seen face-to-face.  She is pleasant on approach.  She has been taking risperidone which she finds somewhat helpful.  She has some loose associations and poor insight into her mental illness.  We will continue with the plan to admit her to psychiatry.  At this time she is without a sitter and denies any suicidal thoughts.      HISTORY OF PRESENT ILLNESS:    Leila Murphy is a 63 y.o. female with significant psychiatric history of schizophrenia who is admitted medically for chest pain.  Patient was recently admitted to [facility] psychiatric unit 1/30/2024 - 2/19/2024 for symptoms of depression and psychosis. Patient was prescribed Zoloft and Risperdal.  She was discharged to her brother and sister and has stayed within the past 2 weeks.  There is concern that patient's brother was withholding her antipsychotic medication and the ED  contacted Adult Protective Services.  Prior to hospitalization at [facility], patient had been living at home with her adult son.  Patient was having difficulty caring for herself and had been exhibiting increasing signs of psychosis since Thanksgiving of last year.  Nursing staff report patient has been significantly paranoid during this admission.     Patient was seen for initial evaluation today.  She was resting in bed and awake. She was pleasant and agreeable to conversation. Patient is evasive regarding psychiatric history. She is denying history of schizophrenia, hallucinations, or paranoia.  Writer inquires about recent stay at [facility] and she states it was only because she was feeling depressed.  Patient is clearly a poor historian and has no insight into her mental illness.  She appears to have limited capacity to make decisions for herself and may benefit  insight     Psycho-education conducted.  Supportive Therapy conducted.  Follow-up daily while on inpatient unit    Electronically signed by SIOBHAN HARDIN MD on 3/11/2024 at 6:45 PM

## 2024-03-11 NOTE — PROGRESS NOTES
[facility]  Progress Note                   Date:   3/11/2024  Patient name: Leila Murphy  Date of admission:  3/8/2024 11:24 AM  MRN:   [id number]  YOB: 1960  PCP: Tye Monroy MD    Reason for Admission: Pre-syncope [R55]  Severe sepsis with acute organ dysfunction (HCC) [A41.9, R65.20]    Subjective:       Clinical Changes /Abnormalities: Seen & examined in stress lab. Vitals & tele reviewed. No acute CV issues/concerns overnight. Labs & vitals reviewed. Remains SR     Review of Systems    Medications:   Scheduled Meds:   [Held by provider] potassium chloride  10 mEq IntraVENous Q1H    apixaban  5 mg Oral BID    risperiDONE  1 mg Oral Daily    metoprolol tartrate  25 mg Oral BID    aspirin  81 mg Oral Daily    atorvastatin  40 mg Oral Nightly    sodium chloride flush  5-40 mL IntraVENous 2 times per day    cefTRIAXone (ROCEPHIN) IV  1,000 mg IntraVENous Q24H     Continuous Infusions:   sodium chloride 5 mL/hr at 03/09/24 0142     CBC:   Recent Labs     03/08/24  1225 03/09/24  0737 03/10/24  0713   WBC 11.9* 7.1 5.4   HGB 13.6 12.8 13.1    169 188     BMP:    Recent Labs     03/08/24  1225 03/09/24  0737 03/10/24  0713    139 141   K 3.8 3.0* 2.9*    104 102   CO2 24 26 26   BUN 13 11 7*   CREATININE 0.9 0.7 0.6   GLUCOSE 190* 117* 102*     Hepatic:No results for input(s): \"AST\", \"ALT\", \"ALB\", \"BILITOT\", \"ALKPHOS\" in the last 72 hours.  Troponin:   Recent Labs     03/08/24  1423 03/09/24  0737 03/10/24  0713   TROPHS 29* 32* 30*     BNP: No results for input(s): \"BNP\" in the last 72 hours.  Lipids: No results for input(s): \"CHOL\", \"HDL\" in the last 72 hours.    Invalid input(s): \"LDLCALCU\"  INR:   Recent Labs     03/08/24  1733   INR 1.1       Objective:   Vitals: /79   Pulse 79   Temp 98.1 °F (36.7 °C) (Oral)   Resp 18   Ht 1.524 m (5')   Wt 73 kg (160 lb 15 oz)   SpO2 93%   BMI 31.43 kg/m²   General appearance: alert and cooperative with

## 2024-03-11 NOTE — PROGRESS NOTES
Pt refused labs this morning before she left per lab. Writer asked lab to come back and we can speak w/ her and try again.    Writer notified Dr. Tara Rae of the above and that she can eat and asked for a diet order. We will readdress once the pt eats and see if we can get labs on her.

## 2024-03-12 ENCOUNTER — HOSPITAL ENCOUNTER (INPATIENT)
Age: 64
LOS: 3 days | Discharge: HOME | End: 2024-03-15
Attending: PSYCHIATRY & NEUROLOGY | Admitting: PSYCHIATRY & NEUROLOGY
Payer: MEDICAID

## 2024-03-12 VITALS
RESPIRATION RATE: 18 BRPM | SYSTOLIC BLOOD PRESSURE: 130 MMHG | DIASTOLIC BLOOD PRESSURE: 79 MMHG | OXYGEN SATURATION: 100 % | BODY MASS INDEX: 31.6 KG/M2 | HEART RATE: 68 BPM | TEMPERATURE: 98.2 F | HEIGHT: 60 IN | WEIGHT: 160.94 LBS

## 2024-03-12 LAB
ANION GAP SERPL CALCULATED.3IONS-SCNC: 8 MMOL/L (ref 9–17)
BASOPHILS # BLD: 0 K/UL (ref 0–0.2)
BASOPHILS NFR BLD: 1 % (ref 0–2)
BUN SERPL-MCNC: 3 MG/DL (ref 8–23)
CALCIUM SERPL-MCNC: 8.8 MG/DL (ref 8.6–10.4)
CHLORIDE SERPL-SCNC: 103 MMOL/L (ref 98–107)
CO2 SERPL-SCNC: 29 MMOL/L (ref 20–31)
CREAT SERPL-MCNC: 0.6 MG/DL (ref 0.5–0.9)
EOSINOPHIL # BLD: 0.3 K/UL (ref 0–0.4)
EOSINOPHILS RELATIVE PERCENT: 5 % (ref 0–4)
ERYTHROCYTE [DISTWIDTH] IN BLOOD BY AUTOMATED COUNT: 14.4 % (ref 11.5–14.9)
GFR SERPL CREATININE-BSD FRML MDRD: >60 ML/MIN/1.73M2
GLUCOSE SERPL-MCNC: 119 MG/DL (ref 70–99)
HCT VFR BLD AUTO: 39.6 % (ref 36–46)
HGB BLD-MCNC: 12.9 G/DL (ref 12–16)
LYMPHOCYTES NFR BLD: 1.4 K/UL (ref 1–4.8)
LYMPHOCYTES RELATIVE PERCENT: 28 % (ref 24–44)
MCH RBC QN AUTO: 29.8 PG (ref 26–34)
MCHC RBC AUTO-ENTMCNC: 32.6 G/DL (ref 31–37)
MCV RBC AUTO: 91.2 FL (ref 80–100)
MONOCYTES NFR BLD: 0.6 K/UL (ref 0.1–1.3)
MONOCYTES NFR BLD: 13 % (ref 1–7)
NEUTROPHILS NFR BLD: 53 % (ref 36–66)
NEUTS SEG NFR BLD: 2.7 K/UL (ref 1.3–9.1)
PLATELET # BLD AUTO: 212 K/UL (ref 150–450)
PMV BLD AUTO: 9.1 FL (ref 6–12)
POTASSIUM SERPL-SCNC: 4.1 MMOL/L (ref 3.7–5.3)
RBC # BLD AUTO: 4.34 M/UL (ref 4–5.2)
SODIUM SERPL-SCNC: 140 MMOL/L (ref 135–144)
TROPONIN I SERPL HS-MCNC: 16 NG/L (ref 0–14)
WBC OTHER # BLD: 5 K/UL (ref 3.5–11)

## 2024-03-12 PROCEDURE — 97530 THERAPEUTIC ACTIVITIES: CPT

## 2024-03-12 PROCEDURE — 6370000000 HC RX 637 (ALT 250 FOR IP): Performed by: PSYCHIATRY & NEUROLOGY

## 2024-03-12 PROCEDURE — 80048 BASIC METABOLIC PNL TOTAL CA: CPT

## 2024-03-12 PROCEDURE — 1240000000 HC EMOTIONAL WELLNESS R&B

## 2024-03-12 PROCEDURE — 36415 COLL VENOUS BLD VENIPUNCTURE: CPT

## 2024-03-12 PROCEDURE — 2580000003 HC RX 258

## 2024-03-12 PROCEDURE — 85025 COMPLETE CBC W/AUTO DIFF WBC: CPT

## 2024-03-12 PROCEDURE — 84484 ASSAY OF TROPONIN QUANT: CPT

## 2024-03-12 PROCEDURE — 99232 SBSQ HOSP IP/OBS MODERATE 35: CPT | Performed by: PSYCHIATRY & NEUROLOGY

## 2024-03-12 PROCEDURE — 6370000000 HC RX 637 (ALT 250 FOR IP): Performed by: INTERNAL MEDICINE

## 2024-03-12 PROCEDURE — 99232 SBSQ HOSP IP/OBS MODERATE 35: CPT | Performed by: INTERNAL MEDICINE

## 2024-03-12 PROCEDURE — 83036 HEMOGLOBIN GLYCOSYLATED A1C: CPT

## 2024-03-12 PROCEDURE — 6360000002 HC RX W HCPCS

## 2024-03-12 RX ORDER — ACETAMINOPHEN 325 MG/1
650 TABLET ORAL EVERY 6 HOURS PRN
Status: DISCONTINUED | OUTPATIENT
Start: 2024-03-12 | End: 2024-03-15 | Stop reason: HOSPADM

## 2024-03-12 RX ORDER — POLYETHYLENE GLYCOL 3350 17 G/17G
17 POWDER, FOR SOLUTION ORAL DAILY PRN
Status: DISCONTINUED | OUTPATIENT
Start: 2024-03-12 | End: 2024-03-15 | Stop reason: HOSPADM

## 2024-03-12 RX ORDER — MIDODRINE HYDROCHLORIDE 2.5 MG/1
2.5 TABLET ORAL 3 TIMES DAILY PRN
Status: CANCELLED | OUTPATIENT
Start: 2024-03-12

## 2024-03-12 RX ORDER — HALOPERIDOL 5 MG/ML
5 INJECTION INTRAMUSCULAR EVERY 6 HOURS PRN
Status: DISCONTINUED | OUTPATIENT
Start: 2024-03-12 | End: 2024-03-15 | Stop reason: HOSPADM

## 2024-03-12 RX ORDER — HYDROXYZINE 50 MG/1
50 TABLET, FILM COATED ORAL 3 TIMES DAILY PRN
Status: DISCONTINUED | OUTPATIENT
Start: 2024-03-12 | End: 2024-03-15 | Stop reason: HOSPADM

## 2024-03-12 RX ORDER — HALOPERIDOL 5 MG/1
5 TABLET ORAL EVERY 6 HOURS PRN
Status: DISCONTINUED | OUTPATIENT
Start: 2024-03-12 | End: 2024-03-15 | Stop reason: HOSPADM

## 2024-03-12 RX ORDER — MAGNESIUM HYDROXIDE/ALUMINUM HYDROXICE/SIMETHICONE 120; 1200; 1200 MG/30ML; MG/30ML; MG/30ML
30 SUSPENSION ORAL EVERY 6 HOURS PRN
Status: DISCONTINUED | OUTPATIENT
Start: 2024-03-12 | End: 2024-03-15 | Stop reason: HOSPADM

## 2024-03-12 RX ORDER — IBUPROFEN 400 MG/1
400 TABLET ORAL EVERY 6 HOURS PRN
Status: DISCONTINUED | OUTPATIENT
Start: 2024-03-12 | End: 2024-03-15 | Stop reason: HOSPADM

## 2024-03-12 RX ORDER — DIPHENHYDRAMINE HYDROCHLORIDE 50 MG/ML
50 INJECTION INTRAMUSCULAR; INTRAVENOUS EVERY 6 HOURS PRN
Status: DISCONTINUED | OUTPATIENT
Start: 2024-03-12 | End: 2024-03-15 | Stop reason: HOSPADM

## 2024-03-12 RX ORDER — RISPERIDONE 1 MG/1
1 TABLET ORAL DAILY
Status: CANCELLED | OUTPATIENT
Start: 2024-03-13

## 2024-03-12 RX ORDER — POLYETHYLENE GLYCOL 3350 17 G
2 POWDER IN PACKET (EA) ORAL
Status: DISCONTINUED | OUTPATIENT
Start: 2024-03-12 | End: 2024-03-15 | Stop reason: HOSPADM

## 2024-03-12 RX ORDER — TRAZODONE HYDROCHLORIDE 50 MG/1
50 TABLET ORAL NIGHTLY PRN
Status: DISCONTINUED | OUTPATIENT
Start: 2024-03-12 | End: 2024-03-15 | Stop reason: HOSPADM

## 2024-03-12 RX ORDER — ASPIRIN 81 MG/1
81 TABLET, CHEWABLE ORAL DAILY
Status: CANCELLED | OUTPATIENT
Start: 2024-03-13

## 2024-03-12 RX ORDER — ATORVASTATIN CALCIUM 40 MG/1
40 TABLET, FILM COATED ORAL NIGHTLY
Status: CANCELLED | OUTPATIENT
Start: 2024-03-12

## 2024-03-12 RX ADMIN — ASPIRIN 81 MG: 81 TABLET, CHEWABLE ORAL at 09:34

## 2024-03-12 RX ADMIN — CEFTRIAXONE SODIUM 1000 MG: 1 INJECTION, POWDER, FOR SOLUTION INTRAMUSCULAR; INTRAVENOUS at 18:23

## 2024-03-12 RX ADMIN — RISPERIDONE 1 MG: 1 TABLET, FILM COATED ORAL at 09:34

## 2024-03-12 RX ADMIN — METOPROLOL TARTRATE 25 MG: 25 TABLET, FILM COATED ORAL at 09:34

## 2024-03-12 RX ADMIN — SODIUM CHLORIDE, PRESERVATIVE FREE 10 ML: 5 INJECTION INTRAVENOUS at 09:35

## 2024-03-12 RX ADMIN — APIXABAN 5 MG: 5 TABLET, FILM COATED ORAL at 09:34

## 2024-03-12 ASSESSMENT — ENCOUNTER SYMPTOMS
CONSTIPATION: 0
DIARRHEA: 0
SHORTNESS OF BREATH: 0
VOMITING: 0
ABDOMINAL PAIN: 0
NAUSEA: 0

## 2024-03-12 ASSESSMENT — PATIENT HEALTH QUESTIONNAIRE - PHQ9
SUM OF ALL RESPONSES TO PHQ QUESTIONS 1-9: 0
1. LITTLE INTEREST OR PLEASURE IN DOING THINGS: 0
SUM OF ALL RESPONSES TO PHQ9 QUESTIONS 1 & 2: 0
SUM OF ALL RESPONSES TO PHQ QUESTIONS 1-9: 0
2. FEELING DOWN, DEPRESSED OR HOPELESS: NOT AT ALL
SUM OF ALL RESPONSES TO PHQ QUESTIONS 1-9: 0
SUM OF ALL RESPONSES TO PHQ QUESTIONS 1-9: 0
2. FEELING DOWN, DEPRESSED OR HOPELESS: 0

## 2024-03-12 ASSESSMENT — PAIN SCALES - GENERAL: PAINLEVEL_OUTOF10: 0

## 2024-03-12 ASSESSMENT — SLEEP AND FATIGUE QUESTIONNAIRES
DO YOU USE A SLEEP AID: NO
AVERAGE NUMBER OF SLEEP HOURS: 6
DO YOU HAVE DIFFICULTY SLEEPING: NO

## 2024-03-12 NOTE — PLAN OF CARE
Problem: Discharge Planning  Goal: Discharge to home or other facility with appropriate resources  Outcome: Progressing     Problem: Pain  Goal: Verbalizes/displays adequate comfort level or baseline comfort level  Outcome: Progressing     Problem: ABCDS Injury Assessment  Goal: Absence of physical injury  Outcome: Progressing     Problem: Safety - Adult  Goal: Free from fall injury  Outcome: Progressing    Telesitter remained in place today. No falls noted today      Problem: Cardiovascular - Adult  Goal: Absence of cardiac dysrhythmias or at baseline  Outcome: Progressing  Flowsheets (Taken 3/11/2024 1215)  Absence of cardiac dysrhythmias or at baseline: Monitor cardiac rate and rhythm     Problem: Genitourinary - Adult  Goal: Absence of urinary retention  Outcome: Progressing  Flowsheets (Taken 3/11/2024 1215)  Absence of urinary retention: Assess patient’s ability to void and empty bladder     Problem: Infection - Adult  Goal: Absence of infection at discharge  Outcome: Progressing    Received IV antiobiotc today. Mentation better   Problem: Skin/Tissue Integrity  Goal: Absence of new skin breakdown  Description: 1.  Monitor for areas of redness and/or skin breakdown  2.  Assess vascular access sites hourly  3.  Every 4-6 hours minimum:  Change oxygen saturation probe site  4.  Every 4-6 hours:  If on nasal continuous positive airway pressure, respiratory therapy assess nares and determine need for appliance change or resting period.  Outcome: Progressing

## 2024-03-12 NOTE — CARE COORDINATION
ONGOING DISCHARGE PLAN:    Plan remains for Pt. To Return to [facility], as per Psych Rec.     Per Notes, Pt. Has been medically cleared By Cardiology. Pt. Had Stress test yesterday.    Writer did Notify, Pt's Sister, Valentina, [phone] of above.       Will continue to follow for additional discharge needs.    If patient is discharged prior to next notation, then this note serves as note for discharge by case management.    Electronically signed by Rosa Crouch RN on 3/12/2024 at 4:24 PM

## 2024-03-12 NOTE — PROGRESS NOTES
Department of Psychiatry  Consult Progress Note  3/12/2024    Patient Name: Leila Murphy    Reason for initial consult:  psychiatric evaluation, psychosis          Interval History:      The patient was seen face-to-face.  The patient seems to be improving in her mood and her thought process, which is clearer and more organized.  The patient denies having any past history of schizophrenia despite having a previous diagnosis.  The patient currently has little insight.  She is taking her risperidone as prescribed.  She is likely to be medically cleared today and at this time we will continue with the plan to admit the patient to psychiatry.      HISTORY OF PRESENT ILLNESS:    Leila Murphy is a 63 y.o. female with significant psychiatric history of schizophrenia who is admitted medically for chest pain.  Patient was recently admitted to [facility] psychiatric unit 1/30/2024 - 2/19/2024 for symptoms of depression and psychosis. Patient was prescribed Zoloft and Risperdal.  She was discharged to her brother and sister and has stayed within the past 2 weeks.  There is concern that patient's brother was withholding her antipsychotic medication and the ED  contacted Adult Protective Services.  Prior to hospitalization at [facility], patient had been living at home with her adult son.  Patient was having difficulty caring for herself and had been exhibiting increasing signs of psychosis since Thanksgiving of last year.  Nursing staff report patient has been significantly paranoid during this admission.     Patient was seen for initial evaluation today.  She was resting in bed and awake. She was pleasant and agreeable to conversation. Patient is evasive regarding psychiatric history. She is denying history of schizophrenia, hallucinations, or paranoia.  Writer inquires about recent stay at [facility] and she states it was only because she was feeling depressed.  Patient is clearly a poor historian and has no

## 2024-03-12 NOTE — PLAN OF CARE
Problem: Discharge Planning  Goal: Discharge to home or other facility with appropriate resources  Outcome: Progressing     Problem: ABCDS Injury Assessment  Goal: Absence of physical injury  Outcome: Progressing     Problem: Safety - Adult  Goal: Free from fall injury  Outcome: Progressing     Problem: Cardiovascular - Adult  Goal: Absence of cardiac dysrhythmias or at baseline  Outcome: Progressing     Pt remains free of falls or injuries. Pt cardiac rhythm maintained in sinus rhythm.

## 2024-03-12 NOTE — PROGRESS NOTES
normalized   - Given 1.5 L IV bolus -continuous IV fluids stopped  - Midodrine 2.5 mg PRN  - UA 3/7 indicates UTI, Urine culture growing e coli   - on IV Rocephin  - Blood cultures remain negative     Schizophrenia w/ paranoia  - Patient transferred from [facility]  - Psych consulted, recs appreciated   - Started on risperidone 1 mg daily  - will be pink slipped if not agreeable to go to [facility] or any risk of elopement         DVT prophylaxis: Eliquis 5 mg twice daily  GI prophylaxis: n/a  Diet: Regular diet  Disposition: Likely transfer to [facility] today once okay with cardiology     OT/PT/SW     Code Status: Full code      Plan will be discussed with the attending, Dr Tani Rae MD  PGY I Family Medicine Resident  3/12/2024 8:00 AM       Attending Physician Statement  I have discussed the care of Leila Murphy and I have examined the patient myselft and taken ros and hpi , including pertinent history and exam findings,  with the resident. I have reviewed the key elements of all parts of the encounter with the resident.  I agree with the assessment, plan and orders as documented by the resident.      Electronically signed by Romi Freire MD

## 2024-03-12 NOTE — PROGRESS NOTES
[facility]   INPATIENT OCCUPATIONAL THERAPY  PROGRESS NOTE  Date: 3/12/2024  Patient Name: Leila Murphy       Room: -  MRN: [id number]    : 1960  (63 y.o.)  Gender: female      Diagnosis: Severe sepsis with acute organ dysfunction    Atrial fibrillation with RVR - converted to normal sinus rhythm off amiodarone drip   NSTEMI (non-ST elevated myocardial infarction)    Cardiomyopathy  Acute exacerbation of chronic paranoid schizophrenia. Acute dehydration with lactic acidosis/high anion gap metabolic acidosis - pt not wanting to eat or drink at home recently      Discharge Recommendations:  The patient may need non-skilled ADL assistance after discharge.       OT Equipment Recommendations  Equipment Needed: Yes  Other: tub bench recommended.  family has tub seat or bench that is in storage. may need toilet grab bars, reachers, call alert necklace. may need 24 hour care (does at this time- 3-10)    Restrictions/Precautions  Restrictions/Precautions  Restrictions/Precautions: General Precautions;Fall Risk    BP: (!) 138/54  MAP (Calculated): 82  O2 Device: None (Room air)    Subjective  Subjective  Subjective: \"I need to use the walker.\" Pt agreeable to ambulate, however requesting to use walker. Writer  encouraged  patient to walk with no device, but patient insisted  that  she  uses one. Writer provided RW, in which pt initially said \"We haven't been  using that\"  then immediately declined walking without the device.       Objective  Orientation  Overall Orientation Status: Within Functional Limits  Orientation Level: Oriented X4  Cognition  Overall Cognitive Status: Exceptions  Arousal/Alertness: Appropriate responses to stimuli  Following Commands: Follows one step commands with repetition  Attention Span: Appears intact  Safety Judgement: Decreased awareness of need for safety  Problem Solving: Assistance required to generate solutions;Assistance required to implement

## 2024-03-12 NOTE — PLAN OF CARE
Problem: Discharge Planning  Goal: Discharge to home or other facility with appropriate resources  3/11/2024 2220 by Marco Antonio Gonzalez RN  Outcome: Progressing  3/11/2024 2032 by Abena Wiseman RN  Outcome: Progressing     Problem: Pain  Goal: Verbalizes/displays adequate comfort level or baseline comfort level  3/11/2024 2220 by Marco Antonio Gonzalez RN  Outcome: Progressing  3/11/2024 2032 by Abena Wiseman RN  Outcome: Progressing     Problem: ABCDS Injury Assessment  Goal: Absence of physical injury  3/11/2024 2220 by Marco Antonio Gonzalez RN  Outcome: Progressing  3/11/2024 2032 by Abena Wiseman RN  Outcome: Progressing     Problem: Safety - Adult  Goal: Free from fall injury  3/11/2024 2220 by Marco Antonio Gonzalez RN  Outcome: Progressing  3/11/2024 2032 by Abena Wiseman RN  Outcome: Progressing     Problem: Cardiovascular - Adult  Goal: Absence of cardiac dysrhythmias or at baseline  3/11/2024 2220 by Marco Antonio Gonzalez RN  Outcome: Progressing  3/11/2024 2032 by Abena Wiseman RN  Outcome: Progressing  Flowsheets (Taken 3/11/2024 1215)  Absence of cardiac dysrhythmias or at baseline: Monitor cardiac rate and rhythm  Goal: Maintains optimal cardiac output and hemodynamic stability  Outcome: Progressing     Problem: Genitourinary - Adult  Goal: Absence of urinary retention  3/11/2024 2220 by Marco Antonio Gonzalez RN  Outcome: Progressing  3/11/2024 2032 by Abena Wiseman RN  Outcome: Progressing  Flowsheets (Taken 3/11/2024 1215)  Absence of urinary retention: Assess patient’s ability to void and empty bladder     Problem: Infection - Adult  Goal: Absence of infection at discharge  3/11/2024 2220 by Marco Antonio Gonzalez RN  Outcome: Progressing  3/11/2024 2032 by Abena Wiseman RN  Outcome: Progressing     Problem: Skin/Tissue Integrity  Goal: Absence of new skin breakdown  Description: 1.  Monitor for areas of redness and/or skin breakdown  2.  Assess vascular access sites hourly  3.  Every 4-6

## 2024-03-12 NOTE — PROGRESS NOTES
Provider notified of patient chest pain.  Orders placed for stat EKG and troponin.  Troponin reduced at 16.  Advised RN to update cardiology.

## 2024-03-12 NOTE — PROGRESS NOTES
RN spoke with GAUDENCIO Shook from Cardiology. Stress test reviewed. Patient is cleared by cardiology for [facility].

## 2024-03-13 PROCEDURE — 1240000000 HC EMOTIONAL WELLNESS R&B

## 2024-03-13 PROCEDURE — 6370000000 HC RX 637 (ALT 250 FOR IP): Performed by: PSYCHIATRY & NEUROLOGY

## 2024-03-13 PROCEDURE — 99222 1ST HOSP IP/OBS MODERATE 55: CPT | Performed by: PSYCHIATRY & NEUROLOGY

## 2024-03-13 PROCEDURE — 99223 1ST HOSP IP/OBS HIGH 75: CPT | Performed by: INTERNAL MEDICINE

## 2024-03-13 PROCEDURE — APPSS60 APP SPLIT SHARED TIME 46-60 MINUTES: Performed by: NURSE PRACTITIONER

## 2024-03-13 PROCEDURE — 6370000000 HC RX 637 (ALT 250 FOR IP)

## 2024-03-13 RX ORDER — OMEPRAZOLE 20 MG/1
20 CAPSULE, DELAYED RELEASE ORAL DAILY
Status: ON HOLD | COMMUNITY
End: 2024-03-15 | Stop reason: HOSPADM

## 2024-03-13 RX ORDER — RISPERIDONE 2 MG/1
2 TABLET ORAL DAILY
Status: ON HOLD | COMMUNITY
End: 2024-03-15 | Stop reason: HOSPADM

## 2024-03-13 RX ORDER — MIDODRINE HYDROCHLORIDE 5 MG/1
2.5 TABLET ORAL 3 TIMES DAILY PRN
Status: DISCONTINUED | OUTPATIENT
Start: 2024-03-13 | End: 2024-03-15 | Stop reason: HOSPADM

## 2024-03-13 RX ORDER — RISPERIDONE 1 MG/1
1 TABLET ORAL DAILY
Status: DISCONTINUED | OUTPATIENT
Start: 2024-03-13 | End: 2024-03-15 | Stop reason: HOSPADM

## 2024-03-13 RX ORDER — ATORVASTATIN CALCIUM 20 MG/1
40 TABLET, FILM COATED ORAL NIGHTLY
Status: DISCONTINUED | OUTPATIENT
Start: 2024-03-13 | End: 2024-03-15 | Stop reason: HOSPADM

## 2024-03-13 RX ORDER — ASPIRIN 81 MG/1
81 TABLET, CHEWABLE ORAL DAILY
Status: DISCONTINUED | OUTPATIENT
Start: 2024-03-13 | End: 2024-03-15 | Stop reason: HOSPADM

## 2024-03-13 RX ORDER — CHOLECALCIFEROL (VITAMIN D3) 50 MCG
2000 TABLET ORAL DAILY
Status: ON HOLD | COMMUNITY
End: 2024-03-15 | Stop reason: HOSPADM

## 2024-03-13 RX ADMIN — METOPROLOL TARTRATE 25 MG: 25 TABLET, FILM COATED ORAL at 20:46

## 2024-03-13 RX ADMIN — RISPERIDONE 1 MG: 1 TABLET, FILM COATED ORAL at 18:07

## 2024-03-13 RX ADMIN — ATORVASTATIN CALCIUM 40 MG: 20 TABLET, FILM COATED ORAL at 20:45

## 2024-03-13 RX ADMIN — SERTRALINE 50 MG: 50 TABLET, FILM COATED ORAL at 20:45

## 2024-03-13 RX ADMIN — ASPIRIN 81 MG: 81 TABLET, CHEWABLE ORAL at 18:07

## 2024-03-13 RX ADMIN — APIXABAN 5 MG: 5 TABLET, FILM COATED ORAL at 20:45

## 2024-03-13 NOTE — PROGRESS NOTES

## 2024-03-13 NOTE — GROUP NOTE
Group Therapy Note    Date: 3/13/2024    Group Start Time: 1330  Group End Time: 1425  Group Topic: Psychoeducation    Joyce Davies CTRS        Group Therapy Note    Attendees: 2/9    Psych-Ed/Relapse Prevention Group Note        Date: March 13, 2024             Start Time: 1:30pm  End Time: 2:25pm      Number of Participants in Group & Unit Census:  2/9    Topic: interpersonal skills, coping skills, self-expression    Goal of Group: To improve interpersonal skills and coping skills awareness through collaborating with peers and demonstrating self-expression.       Comments:     Patient did not participate in Psych-Ed/Relapse Prevention group, despite staff encouragement and explanation of benefits.  Patient remain seclusive to self.  Q15 minute safety checks maintained for patient safety and will continue to encourage patient to attend unit programming.        Signature:  DOUGLAS Trevino

## 2024-03-13 NOTE — DISCHARGE SUMMARY
[facility]   IN-PATIENT SERVICE   [facility]    Discharge Summary     Patient ID: Leila Murphy  :  1960   MRN: [id number]     ACCOUNT:  [id number]   Patient's PCP: Tye Monroy MD  Admit Date: 3/8/2024   Discharge Date: 3/13/2024     Length of Stay: 4  Code Status:  Full Code  Admitting Physician: Romi Freire MD  Discharge Physician: Tara Rae MD     Active Discharge Diagnoses:       Primary Problem  Atrial fibrillation with RVR (HCC)      Hospital Problems  Active Hospital Problems    Diagnosis Date Noted    Atrial fibrillation with RVR (HCC) [I48.91] 2024     Priority: High    NSTEMI (non-ST elevated myocardial infarction) (HCC) [I21.4] 2024     Priority: High    Cardiomyopathy (HCC) [I42.9] 2024     Priority: High    Severe sepsis with acute organ dysfunction (HCC) [A41.9, R65.20] 2024    Pre-syncope [R55] 2024    Acute exacerbation of chronic paranoid schizophrenia (HCC) [F20.0] 2024       Admission Condition:  poor     Discharged Condition: good    Hospital Stay:       Hospital Course:    63-year-old female known case of schizophrenia with paranoia who was recently admitted at [facility] and transferred to [facility].  When patient reached [facility] she had an episode of presyncope, however rapid response was called, patient was found to be hypotensive and moved to the floor.  On evaluation at bedside patient had an elevated heart rate in the 180s to 200s, patient was also hypertensive with blood pressure of 97/57, given 1 L of fluids, EKG done which showed patient to be in A-fib with RVR.  Cardiology was consulted and patient was started on amiodarone drip, patient's heart rate continued to be elevated in the 140s to 150s, patient was given 1 dose of digoxin with resolution of rapid ventricular rate.  Patient denied any chest pain, shortness of breath, lightheadedness, patient is a poor historian, difficult to obtain history,  follow-up imaging is recommended. GI/Bowel: No evidence of bowel obstruction or abnormal wall thickening.  No focal inflammatory changes are identified.  No evidence of acute appendicitis. Pelvis: Distended bladder.  Normal visualized uterus. Peritoneum/Retroperitoneum: No evidence of intraperitoneal free air or ascites.  No suspicious lymphadenopathy.  Normal caliber abdominal aorta. Bones/Soft Tissues: Multilevel degenerative changes of the thoracolumbar spine are noted.  Unremarkable abdominal wall soft tissues.     1. No acute abnormality of the abdomen or pelvis is identified. 2. Cholelithiasis. 3. Distended bladder.     CT HEAD WO CONTRAST    Result Date: 3/6/2024  EXAMINATION: CT OF THE HEAD WITHOUT CONTRAST  3/6/2024 2:53 pm TECHNIQUE: CT of the head was performed without the administration of intravenous contrast. Automated exposure control, iterative reconstruction, and/or weight based adjustment of the mA/kV was utilized to reduce the radiation dose to as low as reasonably achievable. COMPARISON: None. HISTORY: ORDERING SYSTEM PROVIDED HISTORY: AMS TECHNOLOGIST PROVIDED HISTORY: AMS Decision Support Exception - unselect if not a suspected or confirmed emergency medical condition->Emergency Medical Condition (MA) Reason for Exam: paranoid schizo, refusing to eat/drink for past 6 days. altered mental status on arrival. FINDINGS: BRAIN/VENTRICLES: There is no acute intracranial hemorrhage, mass effect or midline shift.  No abnormal extra-axial fluid collection.  The gray-white differentiation is maintained without evidence of an acute infarct.  There is no evidence of hydrocephalus. ORBITS: The visualized portion of the orbits demonstrate no acute abnormality. SINUSES: The visualized paranasal sinuses and mastoid air cells demonstrate no acute abnormality. SOFT TISSUES/SKULL:  No acute abnormality of the visualized skull or soft tissues.     No acute intracranial abnormality.     XR CHEST PORTABLE    Result

## 2024-03-13 NOTE — PLAN OF CARE
Problem: Safety - Adult  Goal: Free from fall injury  Outcome: Progressing     Problem: Self Harm/Suicidality  Goal: Will have no self-injury during hospital stay  Description: INTERVENTIONS:  1.  Ensure constant observer at bedside with Q15M safety checks  2.  Maintain a safe environment  3.  Secure patient belongings  4.  Ensure family/visitors adhere to safety recommendations  5.  Ensure safety tray has been added to patient's diet order  6.  Every shift and PRN: Re-assess suicidal risk via Frequent Screener  Outcome: Progressing     Problem: Psychosis  Goal: Will report no hallucinations or delusions  Description: INTERVENTIONS:  1. Administer medication as  ordered  2. Assist with reality testing to support increasing orientation  3. Assess if patient's hallucinations or delusions are encouraging self harm or harm to others and intervene as appropriate  Outcome: Progressing     Patient denied suicidal ideations. Patient denied homicidal ideations. Safe environment maintained. Safety checks every 15 minutes continued per facility policy. Patient denied auditory, visual, tactile, olfactory and gustatory hallucinations. Patient is a poor historian, does not remember what psych or medical medications she takes at home, no medication verified for shift. Patient transfers self from bed to wheelchair, able to ambulate self with wheelchair. Patient reports that her appetite is adequate. Patient reports that her sleep is adequate. Patient encouraged to attend to hygiene, patient is observed to wet hair and comb, declined shower. Patient able to feed self. Patient has been attending groups. Patient remains free from falls.

## 2024-03-13 NOTE — H&P
Tobacco:    reports that she has never smoked. She has never used smokeless tobacco.  Alcohol:      reports no history of alcohol use.  Drug Use:  reports no history of drug use.    Family History:     No family history on file.    Review of Systems:     Positive and Negative as described in HPI.    CONSTITUTIONAL:  negative for fevers, chills, sweats, fatigue, weight loss  HEENT:  negative for vision, hearing changes, runny nose, throat pain  RESPIRATORY:  negative for shortness of breath, cough, congestion, wheezing.  CARDIOVASCULAR:  negative for chest pain, palpitations.  GASTROINTESTINAL:  negative for nausea, vomiting, diarrhea, constipation, change in bowel habits, abdominal pain   GENITOURINARY:  negative for difficulty of urination, burning with urination, frequency   INTEGUMENT:  negative for rash, skin lesions, easy bruising   HEMATOLOGIC/LYMPHATIC:  negative for swelling/edema   ALLERGIC/IMMUNOLOGIC:  negative for urticaria , itching  ENDOCRINE:  negative increase in drinking, increase in urination, hot or cold intolerance  MUSCULOSKELETAL:  negative joint pains, muscle aches, swelling of joints  NEUROLOGICAL:  negative for headaches, dizziness, lightheadedness, numbness, pain, tingling extremities      Physical Exam:     /69   Pulse 79   Temp 98.2 °F (36.8 °C) (Temporal)   Resp 15   Ht 1.524 m (5')   Wt 72.6 kg (160 lb)   SpO2 94%   BMI 31.25 kg/m²   Temp (24hrs), Av.5 °F (36.9 °C), Min:98.2 °F (36.8 °C), Max:98.8 °F (37.1 °C)    No results for input(s): \"POCGLU\" in the last 72 hours.  No intake or output data in the 24 hours ending [id number]    General Appearance:  alert, well appearing, and in no acute distress  Mental status: oriented to person, place, and time   Head:  normocephalic, atraumatic.  Neck: supple, no carotid bruits, thyroid not palpable  Lungs: Bilateral equal air entry, clear to ausculation, no wheezing, rales or rhonchi, normal effort  Cardiovascular: normal  some errors in transcription may have occurred.

## 2024-03-13 NOTE — PROGRESS NOTES
Pharmacy Medication History Note      List of current medications patient is taking is complete.    Source of information: [facility], [facility] ([place]), [facility] (Joyce [facility])    Changes made to medication list:  Medications removed (include reason, ex. therapy complete or physician discontinued, noncompliance):  Citalopram - list clean uo    Medications flagged for provider review:  none    Medications added/doses adjusted:  Added Risperidone 2 mg daily  Added Sertraline 50 mg nightly  Added Vitamin D daily  Added Omeprazole 20 mg daily     Other notes (ex. Recent course of antibiotics, Coumadin dosing):  OARRS negative  Patient discharged from [facility] on the medications below  Macrobid was started earlier this month for UTI, but never picked up from the pharmacy       Current Home Medication List at Time of Admission:  Prior to Admission medications    Medication Sig   risperiDONE (RISPERDAL) 2 MG tablet Take 1 tablet by mouth daily   sertraline (ZOLOFT) 50 MG tablet Take 1 tablet by mouth at bedtime   vitamin D (CHOLECALCIFEROL) 50 MCG (2000 UT) TABS tablet Take 1 tablet by mouth daily   omeprazole (PRILOSEC) 20 MG delayed release capsule Take 1 capsule by mouth daily         Please let me know if you have any questions about this encounter. Thank you!    Electronically signed by Joyce Saini formerly [facility] on 3/13/2024 at 11:47 AM

## 2024-03-13 NOTE — GROUP NOTE
Group Therapy Note    Date: 3/13/2024    Group Start Time: 1100  Group End Time: 1140  Group Topic: Cognitive Skills    Joyce Davies CTRS        Group Therapy Note    Attendees: 3/10    Cognitive Skills Group Note        Date: March 13, 2024       Start Time: 11am  End Time: 11:40am      Number of Participants in Group & Unit Census:  3/10    Topic:  interpersonal skills, memory recall, self-expression    Goal of Group: To improve interpersonal skills and memory recall through collaborating with peers and demonstrating self-expression.      Comments:     Patient did not participate in Cognitive Skills group, despite staff encouragement and explanation of benefits.  Patient remain seclusive to self.  Q15 minute safety checks maintained for patient safety and will continue to encourage patient to attend unit programming.        Signature:  DOUGLAS Trevino

## 2024-03-13 NOTE — PROGRESS NOTES
Behavioral Services  Medicare Certification Upon Admission    I certify that this patient's inpatient psychiatric hospital admission is medically necessary for:    [x] (1) Treatment which could reasonably be expected to improve this patient's condition,       [x] (2) Or for diagnostic study;     AND     [x](2) The inpatient psychiatric services are provided while the individual is under the care of a physician and are included in the individualized plan of care.    Estimated length of stay/service 2-9 days    Plan for post-hospital care -outpatient care    Electronically signed by SIOBHAN HARDIN MD on 3/13/2024 at 9:07 AM

## 2024-03-13 NOTE — GROUP NOTE
Group Therapy Note    Date: 3/13/2024    Group Start Time: 1000  Group End Time: 1030  Group Topic: Psychotherapy    [facility] Chanel العراقي MSW        Group Therapy Note    Attendees: 2/11       Patient's Goal:  Discuss goals and identify steps on how to achieve them    Notes:     Status After Intervention:  Improved    Participation Level: Active Listener and Interactive    Participation Quality: Appropriate and Attentive      Speech:  normal      Thought Process/Content: Logical  Linear      Affective Functioning: Congruent      Mood: euthymic      Level of consciousness:  Oriented x4 and Drowsy      Response to Learning: Able to verbalize current knowledge/experience and Able to verbalize/acknowledge new learning      Endings: None Reported    Modes of Intervention: Education and Support      Discipline Responsible: /Counselor      Signature:  FLORIDALMA Costello

## 2024-03-13 NOTE — CARE COORDINATION
BHI Biopsychosocial Assessment    Current Level of Psychosocial Functioning     Independent   Dependent  XX  Minimal Assist     Psychosocial High Risk Factors (check all that apply)    Unable to obtain meds   Chronic illness/pain  XX  Substance abuse   Lack of Family Support   Financial stress   Isolation   Inadequate Community Resources  Suicide attempt(s)  Not taking medications XX  Victim of crime   Developmental Delay  Unable to manage personal needs  XX  Age 65 or older   Homeless  No transportation   Readmission within 30 days  Unemployment  Traumatic Event    Psychiatric Advanced Directives: N/A    Family to Involve in Treatment: Pt reports her brother, sister, and son are her main supports.    Sexual Orientation:  N/A    Patient Strengths: Housing, Familial Support    Patient Barriers: Hx of MH concerns, Chronic Illness, Non-adherence to medications (potentially son in withholding; APS case open), Lack of Income    Opiate Education Provided:  Pt denies; Pt was informed of AOD resources if needed.    CMHC/mental health history: Pt denies past inpatient tx. Per chart, pt was last at [facility] on 1/30. Pt states she has an appt. at [place] on 3/20/24 Per chart, pt is a poor historian.     Plan of Care   medication management, group/individual therapies, family meetings, psycho -education, treatment team meetings to assist with stabilization    Initial Discharge Plan:  Pt wishes to return to brother and sister's home; States she has a [place] appt. on 3/20    Clinical Summary:  Pt is a 64yo admitted to the [facility] for acute exacerbation of chronic paranoid schizophrenia. Pt was originally admitted to the [facility] on 3/8/24 and discharged to medical same day. Pt denies past inpatient tx. Per chart, pt was last at [facility] on 1/30. Pt states she has an appt. at [place] on 3/20/24 Per chart, pt is a poor historian. Pt reports chronic illness and lack of income. Pt reports having housing with either her brother and sister or her  son; there is an open APS case in [facility] regarding pt and son. Pt reports her brother, sister, and son are all supportive of her. Pt denies having a POA, guardian, or payee.  Pt wishes to return to brother and sister's home; States she has a [place] appt. on 3/20

## 2024-03-13 NOTE — H&P
since hospital admission.  At this time she denies feeling anxious.  Denies suicidal or homicidal ideation.  Denies auditory, visual hallucinations or other perceptual disturbances.  Presents with paranoia.       Patient was recently admitted to [facility] psychiatric unit 1/30/2024 - 2/19/2024 for symptoms of depression and psychosis. Patient was prescribed Zoloft and Risperdal.  She was discharged to her brother and sister.  There is concern that patient's brother was withholding her antipsychotic medication and the ED  contacted Adult Protective Services.  Noted that prior to hospitalization at [facility], patient had been living at home with her adult son.  Patient was having difficulty caring for herself and had been exhibiting increasing signs of psychosis since Thanksgiving of last year.       She denies marijuana or other illicit substance use.  Denies alcohol use.  EtOH negative on 3/6/2024.  UDS positive for benzodiazepine on 3/7/24.    There is significant concern that patient is unable to care for herself due to the severity of her mental illness and is a risk for further decompensation if symptoms are not managed appropriately.         History of head trauma: [] Yes [x] No    History of seizures: [] Yes [x] No    History of violence or aggression: [] Yes [x] No         PSYCHIATRIC HISTORY:  [x] Yes [] No    Currently follows with [facility].  Denies lifetime suicide attempts  Multiple psychiatric hospital admissions    Home Medication Compliance: [] Yes [] No    Past psychiatric medications includes: Risperdal, Zoloft, Celexa     Adverse reactions from psychotropic medications: [] Yes [x] No         Lifetime Psychiatric Review of Systems         Depression: History of     Anxiety: History of     Panic Attacks: Denies     Amberly or Hypomania: Denies     Phobias: Denies     Obsessions and Compulsions: Denies     Body or Vocal Tics: Denies     Visual Hallucinations: Denies     Auditory Hallucinations: Per  reviewed.   Medication management per attending  Monitor need and frequency of PRN medications.    CONSULT:  [x] Yes [] No  Internal medicine for medical management/medical H&P      Risk Management: close watch per standard protocol      Psychotherapy: participation in milieu and group and individual sessions with Attending Physician,  and Physician Assistant/CNP      Estimated length of stay:  2-14 days      GENERAL PATIENT/FAMILY EDUCATION  Patient will understand basic signs and symptoms, patient will understand benefits/risks and potential side effects from proposed medications, and patient will understand their role in recovery.  Family is active in patient's care.   Patient assets that may be helpful during treatment include: Intent to participate and engage in treatment, sufficient fund of knowledge and intellect to understand and utilize treatments.    OUTCOME QUESTIONNAIRE (OQ 30.2):  [x] Completed [] Patient too ill to participate    Goals:    1) Remission of acute psychosis.  2) Stabilization of symptoms prior to discharge.  3) Establish efficacy and tolerability of medications.       Behavioral Services  Medicare Certification     Admission Day 1  I certify that this patient's inpatient psychiatric hospital admission is medically necessary for:    x (1) treatment which could reasonably be expected to improve this patient's condition, or    x (2) diagnostic study or its equivalent.     Time Spent: 60 minutes     Physicians Signature:  Electronically signed by VIOLETA Linder CNP on 3/13/24 at 12:21 PM EDT is a 63 y.o. female being evaluated face to face    --VIOLETA Linder CNP on 3/13/2024 at 12:21 PM    An electronic signature was used to authenticate this note.     Please note that this chart was generated using voice recognition Dragon dictation software.  Although every effort was made to ensure the accuracy of this automated transcription, some errors in transcription may

## 2024-03-14 LAB
EKG ATRIAL RATE: 76 BPM
EKG P AXIS: -10 DEGREES
EKG P-R INTERVAL: 134 MS
EKG Q-T INTERVAL: 380 MS
EKG QRS DURATION: 82 MS
EKG QTC CALCULATION (BAZETT): 427 MS
EKG R AXIS: -23 DEGREES
EKG T AXIS: -2 DEGREES
EKG VENTRICULAR RATE: 76 BPM
EST. AVERAGE GLUCOSE BLD GHB EST-MCNC: 105 MG/DL
HBA1C MFR BLD: 5.3 % (ref 4–6)

## 2024-03-14 PROCEDURE — 1240000000 HC EMOTIONAL WELLNESS R&B

## 2024-03-14 PROCEDURE — 99213 OFFICE O/P EST LOW 20 MIN: CPT

## 2024-03-14 PROCEDURE — 6370000000 HC RX 637 (ALT 250 FOR IP)

## 2024-03-14 PROCEDURE — 6370000000 HC RX 637 (ALT 250 FOR IP): Performed by: PSYCHIATRY & NEUROLOGY

## 2024-03-14 PROCEDURE — 99232 SBSQ HOSP IP/OBS MODERATE 35: CPT | Performed by: INTERNAL MEDICINE

## 2024-03-14 PROCEDURE — APPSS30 APP SPLIT SHARED TIME 16-30 MINUTES: Performed by: NURSE PRACTITIONER

## 2024-03-14 PROCEDURE — 93010 ELECTROCARDIOGRAM REPORT: CPT | Performed by: INTERNAL MEDICINE

## 2024-03-14 PROCEDURE — 99232 SBSQ HOSP IP/OBS MODERATE 35: CPT | Performed by: PSYCHIATRY & NEUROLOGY

## 2024-03-14 PROCEDURE — 6370000000 HC RX 637 (ALT 250 FOR IP): Performed by: INTERNAL MEDICINE

## 2024-03-14 RX ORDER — LISINOPRIL 5 MG/1
2.5 TABLET ORAL DAILY
Status: DISCONTINUED | OUTPATIENT
Start: 2024-03-14 | End: 2024-03-15 | Stop reason: HOSPADM

## 2024-03-14 RX ADMIN — LISINOPRIL 2.5 MG: 5 TABLET ORAL at 15:30

## 2024-03-14 RX ADMIN — RISPERIDONE 1 MG: 1 TABLET, FILM COATED ORAL at 08:37

## 2024-03-14 RX ADMIN — ASPIRIN 81 MG: 81 TABLET, CHEWABLE ORAL at 08:37

## 2024-03-14 RX ADMIN — APIXABAN 5 MG: 5 TABLET, FILM COATED ORAL at 21:06

## 2024-03-14 RX ADMIN — METOPROLOL TARTRATE 25 MG: 25 TABLET, FILM COATED ORAL at 08:37

## 2024-03-14 RX ADMIN — ATORVASTATIN CALCIUM 40 MG: 20 TABLET, FILM COATED ORAL at 21:06

## 2024-03-14 RX ADMIN — APIXABAN 5 MG: 5 TABLET, FILM COATED ORAL at 08:37

## 2024-03-14 RX ADMIN — SERTRALINE 50 MG: 50 TABLET, FILM COATED ORAL at 21:06
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
## 2024-03-14 NOTE — GROUP NOTE
Group Therapy Note    Date: 3/14/2024    Group Start Time: 1330  Group End Time: 1410  Group Topic: Psychoeducation    Joyce Davies CTRS        Group Therapy Note    Attendees: 4/9    Psych-Ed/Relapse Prevention Group Note        Date: March 14, 2024           Start Time: 1:30pm  End Time: 2:10pm      Number of Participants in Group & Unit Census:  4/9    Topic:  interpersonal skills, gratitude, creative expression     Goal of Group: To improve interpersonal skills and gratitude awareness through collaborating with peers and demonstrating creative expression.      Comments:     Patient did not participate in Psych-Ed/Relapse Prevention group, despite staff encouragement and explanation of benefits.  Patient remain seclusive to self.  Q15 minute safety checks maintained for patient safety and will continue to encourage patient to attend unit programming.        Signature:  DOUGLAS Trevino

## 2024-03-14 NOTE — DISCHARGE INSTR - COC
Continuity of Care Form    Patient Name: Leila Murphy   :  1960  MRN:  [id number]    Admit date:  3/12/2024  Discharge date:  ***    Code Status Order: Full Code   Advance Directives:     Admitting Physician:  Pranay Bazan MD  PCP: Tye Monroy MD    Discharging Nurse: ***  Discharging Hospital Unit/Room#: 0203/0203-01  Discharging Unit Phone Number: ***    Emergency Contact:   Extended Emergency Contact Information  Primary Emergency Contact: Cezar Murphy   [facility]  Home Phone: [phone]  Relation: Child  Secondary Emergency Contact: mylene amin  Home Phone: [phone]  Relation: Brother/Sister   needed? No    Past Surgical History:  No past surgical history on file.    Immunization History:     There is no immunization history on file for this patient.    Active Problems:  Patient Active Problem List   Diagnosis Code    Chest pain R07.9    Paranoid reaction, acute ([facility]) F23    Acute exacerbation of chronic paranoid schizophrenia ([facility]) F20.0    Paranoid schizophrenia ([facility]) F20.0    Pre-syncope R55    Severe sepsis with acute organ dysfunction ([facility]) A41.9, R65.20    Atrial fibrillation with RVR ([facility]) I48.91    NSTEMI (non-ST elevated myocardial infarction) ([facility]) I21.4    Cardiomyopathy ([facility]) I42.9       Isolation/Infection:   Isolation            No Isolation          Patient Infection Status       None to display            Nurse Assessment:  Last Vital Signs: BP (!) 151/87   Pulse 79   Temp 97.7 °F (36.5 °C) (Temporal)   Resp 14   Ht 1.524 m (5')   Wt 72.6 kg (160 lb)   SpO2 98%   BMI 31.25 kg/m²     Last documented pain score (0-10 scale): Pain Level: 0  Last Weight:   Wt Readings from Last 1 Encounters:   24 72.6 kg (160 lb)     Mental Status:  {IP PT MENTAL STATUS:78617}    IV Access:  { [facility] IV ACCESS:111070503}    Nursing Mobility/ADLs:  Walking   {CHP DME ADLs:732283574}  Transfer  {CHP DME ADLs:573569842}  Bathing  {CHP DME  ADLs:607395834}  Dressing  {CHP DME ADLs:549723371}  Toileting  {CHP DME ADLs:904127739}  Feeding  {CHP DME ADLs:796745158}  Med Admin  {CHP DME ADLs:176600892}  Med Delivery   { [facility] MED Delivery:864613942}    Wound Care Documentation and Therapy:  Wound 24 Buttocks (Active)   Wound Image   24 1040   Wound Etiology Other 24 1040   Dressing Status New dressing applied 24 1040   Wound Cleansed Cleansed with saline 24 1040   Dressing/Treatment Triad hydro/zinc oxide-based hydrophilic paste 24 1040   Wound Assessment Pink/red 24 1040   Drainage Amount None (dry) 24 1040   Odor None 24 1040   Cynthia-wound Assessment Blanchable erythema 24 1040   Margins Attached edges 24 1040   Number of days: 0     Buttocks: Cleanse with soap and water, pat dry.  Apply Triad cream twice daily and as needed     Elimination:  Continence:   Bowel: {YES / NO:}  Bladder: {YES / NO:}  Urinary Catheter: {Urinary Catheter:833575890}   Colostomy/Ileostomy/Ileal Conduit: {YES / NO:}       Date of Last BM: ***  No intake or output data in the 24 hours ending 24 1509  No intake/output data recorded.    Safety Concerns:     { K[facility] Safety Concerns:735747162}    Impairments/Disabilities:      {[facility] Impairments/Disabilities:135967552}    Nutrition Therapy:  Current Nutrition Therapy:   { KALEIGH Diet List:332232039}    Routes of Feeding: {CHP DME Other Feedings:009861858}  Liquids: {Slp liquid thickness:88154}  Daily Fluid Restriction: {CHP DME Yes amt example:244453860}  Last Modified Barium Swallow with Video (Video Swallowing Test): {Done Not Done Date:}    Treatments at the Time of Hospital Discharge:   Respiratory Treatments: ***  Oxygen Therapy:  {Therapy; copd oxygen:37287}  Ventilator:    {[facility] Vent List:447699413}    Rehab Therapies: {THERAPEUTIC INTERVENTION:7424472324}  Weight Bearing Status/Restrictions: {[facility] CC Weight Bearin}  Other

## 2024-03-14 NOTE — GROUP NOTE
Group Therapy Note    Date: 3/14/2024    Group Start Time: 1100  Group End Time: 1140  Group Topic: Recreational    [facility] Joyce Celis CTRS        Group Therapy Note    Attendees: 2/10    Recreation Group Note        Date: March 14, 2024               Start Time: 11am  End Time: 11:40am      Number of Participants in Group & Unit Census:  2/10    Topic:  interpersonal skills, leisure awareness, concentration     Goal of Group: To improve interpersonal skills and leisure awareness through collaborating with peers and concentrating on a presented task.       Comments:     Patient did not participate in Recreation group, despite staff encouragement and explanation of benefits.  Patient remain seclusive to self.  Q15 minute safety checks maintained for patient safety and will continue to encourage patient to attend unit programming.        Signature:  DOUGLAS Trevino

## 2024-03-14 NOTE — CONSULTS
from patient's fingernails.  Recommend Triad cream to protect.     Response to treatment:  Well tolerated by patient.       Plan:     Plan of Care:     Buttocks: Cleanse with soap and water, pat dry.  Apply Triad cream twice daily and as needed    [x] Turn and reposition every 2 hours while in bed.    [x] Float heels off of bed with pillows under calves.    [] Heel protective boots (heel medix boots) at all times while in bed.    [] Sacral foam dressing to sacrococcygeal area. Use skin barrier film prior to placement. Peel back dressing, inspect skin beneath, and re-secure every shift. Change every 3 days and as needed if loose or soiled. Discontinue sacral foam if repeatedly soiled by incontinence.    [] Apply zinc oxide cream twice daily and as needed after incontinent episodes.    [] Perform routine incontinence care with use of foam cleanser.    [x] Use single layer moisture wicking underpad.    [] Use comfort glide system and wedges to reposition patient.    [x] Keep the head of the bed below 30 degrees unless contraindicated.    [] Pressure reducing chair cushion while up to chair. Reposition every hour while in chair and limit chair time to 2 hour intervals.    [x] Encourage good nutritional intake and fluids. Consult dietician if needed.    Specialty Bed Required :   [] Low Air Loss   [] Pressure Redistribution  [] Fluid Immersion  [] Bariatric  [] Total Pressure Relief  [] Other:     Current Diet: ADULT DIET; Regular  Dietician consult:  No    Discharge Plan:  Placement for patient upon discharge: home with support   Patient appropriate for Outpatient Wound Care Center: N/A    Patient/Caregiver Teaching:  Level of patientunderstanding able to:     [x] Indicates understanding       [] Needs reinforcement  [] Unsuccessful      [x] Verbal Understanding  [] Demonstrated understanding       [] No evidence of learning  [] Refused teaching         [] N/A       Electronically signed by Tia Norris RN on   3/14/2024 at 3:03 PM

## 2024-03-14 NOTE — PROGRESS NOTES
Daily Progress Note  3/14/2024    Patient Name: Leila Murphy    CHIEF COMPLAINT:  Acute psychosis         SUBJECTIVE:      Patient is seen today for a follow up assessment.  Interview conducted in patient's private room with the door open.  She remains compliant with taking scheduled psychotropic medication and denies adverse effects.  She denies feeling depressed or anxious.  Denies suicidal or homicidal ideation.  Denies auditory, visual hallucinations or other perceptual disturbances.  No current evidence of delusions.  She denies paranoia.  Denies problems with sleep or appetite.  Per unit nursing staff patient maintains behavior control with no need for emergency medication for last 24 hours.  Spends majority of time self isolated to room.  We discussed the benefits of long-acting injectable antipsychotic however she is not quite sure about this and wishes to contemplate.  She plans on going to stay at her sister's house upon discharge.  Vitals reviewed, blood pressure noted to be elevated at 151/87 this morning, she denies new symptoms, IM started lisinopril.  No new laboratory results for review.      Appetite:  [x] Adequate/Unchanged  [] Increased  [] Decreased      Sleep:       [x] Adequate/Unchanged  [] Fair  [] Poor      Group Attendance on Unit:   [] Yes   [] Selectively    [x] No    Compliant with scheduled medications: [x] Yes  [] No    Received emergency medications in past 24 hrs: [] Yes   [x] No    Medication Side Effects: Denies         Mental Status Exam  Level of consciousness: Alert and awake   Appearance: Appropriate attire for setting, seated in chair, with fair  grooming and hygiene   Behavior/Motor: Approachable, engages with interviewer, no psychomotor abnormalities   Attitude toward examiner: Cooperative, attentive, good eye contact  Speech: Normal rate, volume, and tone  Mood:  per patient \"good\"  Affect: Euthymic, somewhat bizarre  Thought processes: coherent, more expressive and  linear today  Thought content: Denies homicidal ideation  Suicidal Ideation: Denies suicidal ideations, contracts for safety on the unit.   Delusions: No evidence of delusions.  Denies paranoia.  Perceptual Disturbance: Patient does not appear to be responding to internal stimuli.  Denies AVH.  Cognition: Oriented to self, location, time, and situation  Memory: intact  Insight: poor   Judgement: poor       Data   height is 1.524 m (5') and weight is 72.6 kg (160 lb). Her temporal temperature is 97.7 °F (36.5 °C). Her blood pressure is 151/87 (abnormal) and her pulse is 79. Her respiration is 14 and oxygen saturation is 98%.   Labs:   No visits with results within 2 Day(s) from this visit.   Latest known visit with results is:   No results displayed because visit has over 200 results.            Reviewed patient's current plan of care and vital signs with nursing staff.    Labs reviewed: [x] Yes    Medications  Current Facility-Administered Medications: lisinopril (PRINIVIL;ZESTRIL) tablet 2.5 mg, 2.5 mg, Oral, Daily  apixaban (ELIQUIS) tablet 5 mg, 5 mg, Oral, BID  aspirin chewable tablet 81 mg, 81 mg, Oral, Daily  atorvastatin (LIPITOR) tablet 40 mg, 40 mg, Oral, Nightly  metoprolol tartrate (LOPRESSOR) tablet 25 mg, 25 mg, Oral, BID  midodrine (PROAMATINE) tablet 2.5 mg, 2.5 mg, Oral, TID PRN  risperiDONE (RISPERDAL) tablet 1 mg, 1 mg, Oral, Daily  sertraline (ZOLOFT) tablet 50 mg, 50 mg, Oral, Nightly  acetaminophen (TYLENOL) tablet 650 mg, 650 mg, Oral, Q6H PRN  ibuprofen (ADVIL;MOTRIN) tablet 400 mg, 400 mg, Oral, Q6H PRN  hydrOXYzine HCl (ATARAX) tablet 50 mg, 50 mg, Oral, TID PRN  traZODone (DESYREL) tablet 50 mg, 50 mg, Oral, Nightly PRN  polyethylene glycol (GLYCOLAX) packet 17 g, 17 g, Oral, Daily PRN  aluminum & magnesium hydroxide-simethicone (MAALOX) 200-200-20 MG/5ML suspension 30 mL, 30 mL, Oral, Q6H PRN  haloperidol lactate (HALDOL) injection 5 mg, 5 mg, IntraMUSCular, Q6H PRN **AND** diphenhydrAMINE

## 2024-03-14 NOTE — CARE COORDINATION
Social Work spoke to patients sister Valentina regarding ability for patient to return; Valentina confirmed ability for patient to return to her home at:  [address], [facility], [address]    Valentina stated she or her brother could  patient at discharged anytime after 4PM on Friday 3/15

## 2024-03-14 NOTE — PLAN OF CARE
Problem: Safety - Adult  Goal: Free from fall injury  Note: Patient is free from fall related injuries at this time. Patient is using a wheelchair for mobility and non skid socks for safety. Patient was educated on asking for assistance as needed. 15 minute safety checks continued.     Problem: Self Harm/Suicidality  Goal: Will have no self-injury during hospital stay  Description: INTERVENTIONS:  1.  Ensure constant observer at bedside with Q15M safety checks  2.  Maintain a safe environment  3.  Secure patient belongings  4.  Ensure family/visitors adhere to safety recommendations  5.  Ensure safety tray has been added to patient's diet order  6.  Every shift and PRN: Re-assess suicidal risk via Frequent Screener    Note: Patient is free from self related harm and denies any ideas of harm to herself or others at this time.     Problem: Psychosis  Goal: Will report no hallucinations or delusions  Description: INTERVENTIONS:  1. Administer medication as  ordered  2. Assist with reality testing to support increasing orientation  3. Assess if patient's hallucinations or delusions are encouraging self harm or harm to others and intervene as appropriate  Note: Patient denies hallucinations and does not appear to be responding to internal stimuli. Patient has been isolative to room out for needs only.

## 2024-03-14 NOTE — GROUP NOTE
Group Therapy Note    Date: 3/14/2024    Group Start Time: 1000  Group End Time: 1020  Group Topic: Psychotherapy    [facility] Chanel العراقي MSW        Group Therapy Note    Attendees: 0/10       Patient's Goal:  Discuss and identify healthy v. unhealthy coping skills     Patient was offered group therapy today but declined to participate despite encouragement from staff.  1:1 was offered.      Discipline Responsible: /Counselor      Signature:  FLORIDALMA Costello

## 2024-03-14 NOTE — PLAN OF CARE
Problem: Safety - Adult  Goal: Free from fall injury  3/14/2024 1525 by Tia Gonzalez LPN  Outcome: Progressing   Patient is free from injury and falls this shift. Patient is encouraged to ask for assistance should patient feel unsafe with tasks.   Problem: Self Harm/Suicidality    Goal: Will have no self-injury during hospital stay  Description: INTERVENTIONS:  1.  Ensure constant observer at bedside with Q15M safety checks  2.  Maintain a safe environment  3.  Secure patient belongings  4.  Ensure family/visitors adhere to safety recommendations  5.  Ensure safety tray has been added to patient's diet order  6.  Every shift and PRN: Re-assess suicidal risk via Frequent Screener    3/14/2024 1525 by Tia Gonzalez LPN  Outcome: Progressing   Patient denies thoughts of self harm, depression, and anxiety  Problem: Psychosis  Goal: Will report no hallucinations or delusions    Description: INTERVENTIONS:  1. Administer medication as  ordered  2. Assist with reality testing to support increasing orientation  3. Assess if patient's hallucinations or delusions are encouraging self harm or harm to others and intervene as appropriate  3/14/2024 1525 by Tia Gonzalez LPN  Outcome: Progressing   Patient denies any audio or visual hallucinations and is alert and oriented x 4. Patient is unable to tell staff reason for admission.   15 min safety checks continue.

## 2024-03-15 VITALS
HEART RATE: 84 BPM | HEIGHT: 60 IN | TEMPERATURE: 97.3 F | DIASTOLIC BLOOD PRESSURE: 76 MMHG | OXYGEN SATURATION: 97 % | SYSTOLIC BLOOD PRESSURE: 126 MMHG | WEIGHT: 160 LBS | RESPIRATION RATE: 18 BRPM | BODY MASS INDEX: 31.41 KG/M2

## 2024-03-15 PROBLEM — I10 PRIMARY HYPERTENSION: Status: ACTIVE | Noted: 2024-03-15

## 2024-03-15 LAB
EKG ATRIAL RATE: 192 BPM
EKG Q-T INTERVAL: 258 MS
EKG QRS DURATION: 72 MS
EKG QTC CALCULATION (BAZETT): 451 MS
EKG R AXIS: 6 DEGREES
EKG T AXIS: -37 DEGREES
EKG VENTRICULAR RATE: 184 BPM

## 2024-03-15 PROCEDURE — 6370000000 HC RX 637 (ALT 250 FOR IP): Performed by: INTERNAL MEDICINE

## 2024-03-15 PROCEDURE — 99231 SBSQ HOSP IP/OBS SF/LOW 25: CPT | Performed by: INTERNAL MEDICINE

## 2024-03-15 PROCEDURE — 6370000000 HC RX 637 (ALT 250 FOR IP)

## 2024-03-15 PROCEDURE — 99239 HOSP IP/OBS DSCHRG MGMT >30: CPT | Performed by: PSYCHIATRY & NEUROLOGY

## 2024-03-15 RX ORDER — ASPIRIN 81 MG/1
81 TABLET, CHEWABLE ORAL DAILY
Qty: 30 TABLET | Refills: 3 | Status: SHIPPED | OUTPATIENT
Start: 2024-03-16

## 2024-03-15 RX ORDER — LISINOPRIL 2.5 MG/1
2.5 TABLET ORAL DAILY
Qty: 30 TABLET | Refills: 3 | Status: SHIPPED | OUTPATIENT
Start: 2024-03-16

## 2024-03-15 RX ORDER — RISPERIDONE 1 MG/1
1 TABLET ORAL DAILY
Qty: 60 TABLET | Refills: 3 | Status: SHIPPED | OUTPATIENT
Start: 2024-03-16

## 2024-03-15 RX ORDER — ATORVASTATIN CALCIUM 40 MG/1
40 TABLET, FILM COATED ORAL NIGHTLY
Qty: 30 TABLET | Refills: 3 | Status: SHIPPED | OUTPATIENT
Start: 2024-03-15

## 2024-03-15 RX ADMIN — APIXABAN 5 MG: 5 TABLET, FILM COATED ORAL at 09:30

## 2024-03-15 RX ADMIN — LISINOPRIL 2.5 MG: 5 TABLET ORAL at 09:30

## 2024-03-15 RX ADMIN — RISPERIDONE 1 MG: 1 TABLET, FILM COATED ORAL at 09:30

## 2024-03-15 RX ADMIN — METOPROLOL TARTRATE 25 MG: 25 TABLET, FILM COATED ORAL at 09:30

## 2024-03-15 RX ADMIN — ASPIRIN 81 MG: 81 TABLET, CHEWABLE ORAL at 09:30

## 2024-03-15 NOTE — PROGRESS NOTES
CLINICAL PHARMACY NOTE: MEDS TO BEDS    Total # of Prescriptions Filled: 6   The following medications were delivered to the patient:  Sertraline 50mg  Risperidone 1mg  Metoprolol Tartrate 25mg  Lisinopril 2.5mg  Atorvastatin 40mg  Eliquis 5mg     Additional Documentation: Vouchered 14 ds delivered to MEET Nunez signed in lobby 3/15/24 11:32am jenny
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
## 2024-03-15 NOTE — GROUP NOTE
Group Therapy Note    Date: 3/15/2024    Group Start Time: 1000  Group End Time: 1010  Group Topic: Psychotherapy     Chanel العراقي MSW        Group Therapy Note    Attendees: 2/9       Patient's Goal:  Discuss changes in self from admission to date and how to continue that after discharge    Patient was offered group therapy today but declined to participate despite encouragement from staff.  1:1 was offered.      Discipline Responsible: /Counselor      Signature:  FLORIDALMA Costello
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
## 2024-03-15 NOTE — DISCHARGE INSTR - DIET
Good nutrition is important when healing from an illness, injury, or surgery.  Follow any nutrition recommendations given to you during your hospital stay.   If you were given an oral nutrition supplement while in the hospital, continue to take this supplement at home.  You can take it with meals, in-between meals, and/or before bedtime. These supplements can be purchased at most local grocery stores, pharmacies, and chain [employer]-stores.   If you have any questions about your diet or nutrition, call the hospital and ask for the dietitian.  Continue to follow recommended

## 2024-03-15 NOTE — GROUP NOTE
Group Therapy Note    Date: 3/15/2024    Group Start Time: 1100  Group End Time: 1130  Group Topic: Psychoeducation    Joyce Davies CTRS        Group Therapy Note    Attendees: 3/9    Psych-Ed/Relapse Prevention Group Note        Date: March 15, 2024       Start Time: 11am  End Time: 11:30am      Number of Participants in Group & Unit Census:  3/9    Topic:  interpersonal skills, coping skills, self expression    Goal of Group: To improve interpersonal skills and coping skills awareness through collaborating with peers and demonstrating self-expression.       Comments:     Patient did not participate in Psych-Ed/Relapse Prevention group, despite staff encouragement and explanation of benefits.  Patient remain seclusive to self.  Q15 minute safety checks maintained for patient safety and will continue to encourage patient to attend unit programming.        Signature:  DOUGLAS Trevino

## 2024-03-15 NOTE — GROUP NOTE
Group Therapy Note    Date: 3/15/2024    Group Start Time: 1330  Group End Time: 1420  Group Topic: Relaxation    [facility] Joyce Celis CTRS        Group Therapy Note    Attendees: 4/9    Relaxation Group Note        Date: March 15, 2024              Start Time: 1:30pm  End Time: 2:20pm      Number of Participants in Group & Unit Census:  4/9    Topic: interpersonal skills, leisure awareness, creative expression    Goal of Group: To improve interpersonal skills and leisure awareness through collaborating with peers and demonstrating creative expression.      Comments:     Patient did not participate in Relaxation group, despite staff encouragement and explanation of benefits.  Patient remain seclusive to self.  Q15 minute safety checks maintained for patient safety and will continue to encourage patient to attend unit programming.        Signature:  DOUGLAS Trevino

## 2024-03-15 NOTE — PLAN OF CARE
Problem: Safety - Adult  Goal: Free from fall injury  3/15/2024 1035 by Tia Gonzalez LPN  Outcome: Progressing     Problem: Self Harm/Suicidality  Goal: Will have no self-injury during hospital stay  Description: INTERVENTIONS:  1.  Ensure constant observer at bedside with Q15M safety checks  2.  Maintain a safe environment  3.  Secure patient belongings  4.  Ensure family/visitors adhere to safety recommendations  5.  Ensure safety tray has been added to patient's diet order  6.  Every shift and PRN: Re-assess suicidal risk via Frequent Screener    3/15/2024 1035 by Tia Gonzalez LPN  Outcome: Progressing     Problem: Psychosis  Goal: Will report no hallucinations or delusions  Description: INTERVENTIONS:  1. Administer medication as  ordered  2. Assist with reality testing to support increasing orientation  3. Assess if patient's hallucinations or delusions are encouraging self harm or harm to others and intervene as appropriate  3/15/2024 1035 by Tia Gonzalez LPN  Outcome: Progressing   Patient is free from falls this shift. Patient  denies hallucinations, Patient denies Anxiety and depression. 15 min safety checks continue

## 2024-03-15 NOTE — TRANSITION OF CARE
Behavioral Health Transition Record to Provider    Patient Name: Leila Murphy  YOB: 1960   Medical Record Number: [id number]  Date of Admission: 3/12/2024  8:37 PM   Date of Discharge: 3/15/24    Attending Provider: Pranay Bazan MD   Discharging Provider: Pranay Bazan MD   To contact this individual call [phone] and ask the  to page.  If unavailable, ask to be transferred to Behavioral Health Provider on call.  A Behavioral Health Provider will be available on call 24/7 and during holidays.    Primary Care Provider: Tye oMnroy MD    No Known Allergies    Reason for Admission: Poor ADLs, decompensating, non compliant with medications, thought blocking, increase in depression     Admission Diagnosis: Acute exacerbation of chronic paranoid schizophrenia (HCC) [F20.0]    * No surgery found *    No results found for this visit on 03/12/24.    Immunizations administered during this encounter:   There is no immunization history on file for this patient.  Influenza vaccine was received prior to admission during the current flu season, not during this hospitalization.    Screening for Metabolic Disorders for Patients on Antipsychotic Medications  (Data obtained from the EMR)    Estimated Body Mass Index  Body mass index is 31.25 kg/m².      Vital Signs/Blood Pressure  /76   Pulse 84   Temp 97.3 °F (36.3 °C) (Temporal)   Resp 18   Ht 1.524 m (5')   Wt 72.6 kg (160 lb)   SpO2 97%   BMI 31.25 kg/m²      Fasting Blood Glucose or Hemoglobin A1c  No results found for: \"GLU\", \"GLUCPOC\"    Hemoglobin A1C   Date Value Ref Range Status   03/12/2024 5.3 4.0 - 6.0 % Final       Discharge Diagnosis: Acute Exacerbation of chronic paranoid schizophrenia    Discharge Plan/Destination: follow up with [facility], return home with brother     Discharge Medication List and Instructions:      Medication List        START taking these medications      apixaban 5 MG Tabs  3/20/2024 You have an appointment at [facility] on Wednesday, March 20 at 3:30PM [address]  [place] [id number]  [phone]             Advanced Directive:   Does the patient have an appointed surrogate decision maker? No  Does the patient have a Medical Advance Directive? No  Does the patient have a Psychiatric Advance Directive? No  If the patient does not have a surrogate or Medical Advance Directive AND Psychiatric Advance Directive, the patient was offered information on these advance directives Patient will complete at a later time    Patient Instructions: Please continue all medications until otherwise directed by physician.     Tobacco Cessation Discharge Plan:   Is the patient a tobacco user  and needs referral for tobacco cessation? No  Patient referred to the following for tobacco cessation with an appointment? No  Patient was offered medication to assist with tobacco cessation at discharge? No    Alcohol/Substance Abuse Discharge Plan:   Does the patient have a history of substance/alcohol abuse and requires a referral for treatment? No  Patient referred to the following for substance/alcohol abuse treatment with an appointment? No  Patient was offered medication to assist with alcohol cessation at discharge? No      Patient discharged to: Home with Brother

## 2024-03-15 NOTE — DISCHARGE INSTRUCTIONS
Information:  Medications:   Medication summary provided   I understand that I should take only the medications on my list.     -why and when I need to take each medicine.     -which side effects to watch for.     -that I should carry my medication information at all times in case of     Emergency situations.    I will take all of my medicines to follow up appointments.     -check with my physician or pharmacist before taking any new    Medication, over the counter product or drink alcohol.    -Ask about food, drug or dietary supplement interactions.    -discard old lists and update records with medication providers.    Notify Physician:  Notify physician if you notice:   Always call 911 if you feel your life is in danger  In case of an emergency call 911 immediately!  If 911 is not available call your local emergency medical system for help    Behavioral Health Follow Up:  Original Referral Source:[facility]   Discharge Diagnosis: Acute exacerbation of chronic paranoid schizophrenia (HCC) [F20.0]  Recommendations for Level of Care: Continue to take medication as prescribed   Patient status at discharge: stable   My hospital  was: [facility]   Aftercare plan faxed: yes    -faxed by: Zena   -date: 3/15/24   -time: 1500  Prescriptions: Sent with patient     Smoking: Quit Smoking.   Call the [employer]'s smoking quitline at 7-310-08X-QUIT  Know the signs of a heart attack   If you have any of the following symptoms call 911 immediately, do not wait more    Than five minutes.    1. Pressure, fullness and/ or squeezing in the center of the chest spreading to    The jaw, neck or shoulder.    2. Chest discomfort with light headedness, fainting, sweating, nausea or    Shortness of breath.   3. Upper abdominal pressure or discomfort.   4. Lower chest pain, back pain, unusual fatigue, shortness of breath, nausea   Or dizziness.     General Information:   Questions regarding your bill: Call HELP program [phone]  [phone]     Suicide Hotline ([facility] Crisis Care Line)  (738) 248-4530      Recovery Help line- 175.927.7999      To obtain results of pending studies call Medical Records at: [phone]     For emergencies and 24 hour/7 days a week contact information:  [phone]

## 2024-03-15 NOTE — PLAN OF CARE
Problem: Safety - Adult  Goal: Free from fall injury  3/15/2024 0029 by Berkley Salmon LPN  Outcome: Progressing  Note: Fall precaution continued and maintained. Patient wears non-skid footwear ambulates using walker and is encouraged to seek out staff for any assistance needed.       Problem: Self Harm/Suicidality  Goal: Will have no self-injury during hospital stay  Description: INTERVENTIONS:  1.  Ensure constant observer at bedside with Q15M safety checks  2.  Maintain a safe environment  3.  Secure patient belongings  4.  Ensure family/visitors adhere to safety recommendations  5.  Ensure safety tray has been added to patient's diet order  6.  Every shift and PRN: Re-assess suicidal risk via Frequent Screener    3/15/2024 0029 by Berkley Salmon LPN  Outcome: Progressing  Note: Patient denies suicidal ideations at this time. Patient safety checks are maintained every 15 minutes.     Problem: Psychosis  Goal: Will report no hallucinations or delusions  Description: INTERVENTIONS:  1. Administer medication as  ordered  2. Assist with reality testing to support increasing orientation  3. Assess if patient's hallucinations or delusions are encouraging self harm or harm to others and intervene as appropriate  3/15/2024 0029 by Berkley Salmon LPN  Outcome: Progressing  Note: Patient denies visual/auditory hallucinations or delusions at this time.

## 2024-03-25 NOTE — PROGRESS NOTES
Physician Progress Note      PATIENT:               JOHN DUNN  CSN #:                  [id number]  :                       1960  ADMIT DATE:       3/8/2024 11:24 AM  DISCH DATE:        3/12/2024 7:55 AM  RESPONDING  PROVIDER #:        Romi Freire MD          QUERY TEXT:    Patient admitted with Afib with RVR, UTI  . Noted documentation of \"Severe   sepsis with acute organ dysfunction\" in H&P on 3/8 . In order to support the   diagnosis of sepsis, please include additional clinical indicators in your   documentation.  Or please document if the diagnosis of sepsis has been ruled   out after further study :          The medical record reflects the following:    Risk Factors: UTI,    Clinical Indicators: Documentation reflects in H&P on 3/8? Severe sepsis with   acute organ dysfunction? ?UTI?  On admission  Wbc-11.9(3/8),7.1(3/9)  Lactic   acid-1.9(3/8) T-97,98,98.4 P-196,150, 146    Treatment:  IV fluids, IV antibiotics for UTI -  Rocephin 1000 mg    Thank You,  Clinical Documentation Dept --  Hamzah RAMIREZ, RN, CRCR  Clinical   P: [phone]  F: [phone]  Options provided:  -- Sepsis was ruled out after study  -- Sepsis present as evidenced by, Please document evidence.  -- Other - I will add my own diagnosis  -- Disagree - Not applicable / Not valid  -- Disagree - Clinically unable to determine / Unknown  -- Refer to Clinical Documentation Reviewer    PROVIDER RESPONSE TEXT:    Sepsis was ruled out after study.    Query created by: Hamzah Clark on 3/15/2024 7:30 AM      Electronically signed by:  Romi Freire MD 3/25/2024 10:09 AM

## 2024-05-03 ENCOUNTER — APPOINTMENT (OUTPATIENT)
Dept: MRI IMAGING | Age: 64
DRG: 058 | End: 2024-05-03
Payer: COMMERCIAL

## 2024-05-03 ENCOUNTER — APPOINTMENT (OUTPATIENT)
Dept: CT IMAGING | Age: 64
DRG: 058 | End: 2024-05-03
Payer: COMMERCIAL

## 2024-05-03 ENCOUNTER — HOSPITAL ENCOUNTER (INPATIENT)
Age: 64
LOS: 1 days | Discharge: HOME | DRG: 058 | End: 2024-05-04
Attending: EMERGENCY MEDICINE | Admitting: PSYCHIATRY & NEUROLOGY
Payer: COMMERCIAL

## 2024-05-03 DIAGNOSIS — I48.91 ATRIAL FIBRILLATION WITH RVR (HCC): Primary | ICD-10-CM

## 2024-05-03 DIAGNOSIS — R20.0 LEFT SIDED NUMBNESS: ICD-10-CM

## 2024-05-03 DIAGNOSIS — I71.21 ANEURYSM OF ASCENDING AORTA WITHOUT RUPTURE (HCC): ICD-10-CM

## 2024-05-03 LAB
ANION GAP SERPL CALCULATED.3IONS-SCNC: 13 MMOL/L (ref 9–16)
BASOPHILS # BLD: 0.07 K/UL (ref 0–0.2)
BASOPHILS NFR BLD: 1 % (ref 0–2)
BUN BLD-MCNC: 17 MG/DL (ref 8–26)
BUN SERPL-MCNC: 15 MG/DL (ref 8–23)
CA-I BLD-SCNC: 1.17 MMOL/L (ref 1.15–1.33)
CALCIUM SERPL-MCNC: 9.4 MG/DL (ref 8.6–10.4)
CHLORIDE BLD-SCNC: 106 MMOL/L (ref 98–107)
CHLORIDE SERPL-SCNC: 102 MMOL/L (ref 98–107)
CHOLEST SERPL-MCNC: 211 MG/DL (ref 0–199)
CHOLESTEROL/HDL RATIO: 3
CK SERPL-CCNC: 49 U/L (ref 26–192)
CO2 BLD CALC-SCNC: 30 MMOL/L (ref 22–30)
CO2 SERPL-SCNC: 23 MMOL/L (ref 20–31)
CREAT SERPL-MCNC: 0.7 MG/DL (ref 0.5–0.9)
EGFR, POC: >90 ML/MIN/1.73M2
EOSINOPHIL # BLD: 0.36 K/UL (ref 0–0.44)
EOSINOPHILS RELATIVE PERCENT: 6 % (ref 1–4)
ERYTHROCYTE [DISTWIDTH] IN BLOOD BY AUTOMATED COUNT: 13.1 % (ref 11.8–14.4)
EST. AVERAGE GLUCOSE BLD GHB EST-MCNC: 100 MG/DL
GFR, ESTIMATED: >90 ML/MIN/1.73M2
GLUCOSE BLD-MCNC: 123 MG/DL (ref 74–100)
GLUCOSE SERPL-MCNC: 122 MG/DL (ref 74–99)
HBA1C MFR BLD: 5.1 % (ref 4–6)
HCO3 VENOUS: 30.5 MMOL/L (ref 22–29)
HCT VFR BLD AUTO: 41.4 % (ref 36.3–47.1)
HCT VFR BLD AUTO: 46 % (ref 36–46)
HDLC SERPL-MCNC: 80 MG/DL
HGB BLD-MCNC: 13 G/DL (ref 11.9–15.1)
IMM GRANULOCYTES # BLD AUTO: <0.03 K/UL (ref 0–0.3)
IMM GRANULOCYTES NFR BLD: 0 %
INR PPP: 1.1
LDLC SERPL CALC-MCNC: 114 MG/DL (ref 0–100)
LYMPHOCYTES NFR BLD: 1.58 K/UL (ref 1.1–3.7)
LYMPHOCYTES RELATIVE PERCENT: 25 % (ref 24–43)
MCH RBC QN AUTO: 30.6 PG (ref 25.2–33.5)
MCHC RBC AUTO-ENTMCNC: 31.4 G/DL (ref 28.4–34.8)
MCV RBC AUTO: 97.4 FL (ref 82.6–102.9)
MONOCYTES NFR BLD: 0.57 K/UL (ref 0.1–1.2)
MONOCYTES NFR BLD: 9 % (ref 3–12)
MYOGLOBIN SERPL-MCNC: <21 NG/ML (ref 25–58)
NEUTROPHILS NFR BLD: 59 % (ref 36–65)
NEUTS SEG NFR BLD: 3.84 K/UL (ref 1.5–8.1)
NRBC BLD-RTO: 0 PER 100 WBC
O2 SAT, VEN: 71.4 % (ref 60–85)
PARTIAL THROMBOPLASTIN TIME: 30.7 SEC (ref 23–36.5)
PCO2, VEN: 45.3 MM HG (ref 41–51)
PH VENOUS: 7.44 (ref 7.32–7.43)
PLATELET # BLD AUTO: 204 K/UL (ref 138–453)
PMV BLD AUTO: 11.5 FL (ref 8.1–13.5)
PO2, VEN: 36.6 MM HG (ref 30–50)
POC ANION GAP: 7 MMOL/L (ref 7–16)
POC CREATININE: 0.6 MG/DL (ref 0.51–1.19)
POC HEMOGLOBIN (CALC): 15.6 G/DL (ref 12–16)
POC LACTIC ACID: 1.2 MMOL/L (ref 0.56–1.39)
POSITIVE BASE EXCESS, VEN: 5.3 MMOL/L (ref 0–3)
POTASSIUM BLD-SCNC: 4.8 MMOL/L (ref 3.5–4.5)
POTASSIUM SERPL-SCNC: 3.6 MMOL/L (ref 3.7–5.3)
PROTHROMBIN TIME: 14.1 SEC (ref 11.7–14.9)
RBC # BLD AUTO: 4.25 M/UL (ref 3.95–5.11)
SODIUM BLD-SCNC: 142 MMOL/L (ref 138–146)
SODIUM SERPL-SCNC: 138 MMOL/L (ref 136–145)
TRIGL SERPL-MCNC: 82 MG/DL
TROPONIN I SERPL HS-MCNC: 7 NG/L (ref 0–14)
TSH SERPL DL<=0.05 MIU/L-ACNC: 2.52 UIU/ML (ref 0.27–4.2)
VLDLC SERPL CALC-MCNC: 16 MG/DL
WBC OTHER # BLD: 6.4 K/UL (ref 3.5–11.3)

## 2024-05-03 PROCEDURE — 70450 CT HEAD/BRAIN W/O DYE: CPT

## 2024-05-03 PROCEDURE — 83036 HEMOGLOBIN GLYCOSYLATED A1C: CPT

## 2024-05-03 PROCEDURE — 99285 EMERGENCY DEPT VISIT HI MDM: CPT

## 2024-05-03 PROCEDURE — 85730 THROMBOPLASTIN TIME PARTIAL: CPT

## 2024-05-03 PROCEDURE — 6370000000 HC RX 637 (ALT 250 FOR IP)

## 2024-05-03 PROCEDURE — 80051 ELECTROLYTE PANEL: CPT

## 2024-05-03 PROCEDURE — 82947 ASSAY GLUCOSE BLOOD QUANT: CPT

## 2024-05-03 PROCEDURE — 2060000000 HC ICU INTERMEDIATE R&B

## 2024-05-03 PROCEDURE — 2580000003 HC RX 258

## 2024-05-03 PROCEDURE — 80048 BASIC METABOLIC PNL TOTAL CA: CPT

## 2024-05-03 PROCEDURE — 93005 ELECTROCARDIOGRAM TRACING: CPT | Performed by: PSYCHIATRY & NEUROLOGY

## 2024-05-03 PROCEDURE — 82330 ASSAY OF CALCIUM: CPT

## 2024-05-03 PROCEDURE — 84520 ASSAY OF UREA NITROGEN: CPT

## 2024-05-03 PROCEDURE — 83874 ASSAY OF MYOGLOBIN: CPT

## 2024-05-03 PROCEDURE — 83605 ASSAY OF LACTIC ACID: CPT

## 2024-05-03 PROCEDURE — 6360000002 HC RX W HCPCS

## 2024-05-03 PROCEDURE — 82550 ASSAY OF CK (CPK): CPT

## 2024-05-03 PROCEDURE — 85025 COMPLETE CBC W/AUTO DIFF WBC: CPT

## 2024-05-03 PROCEDURE — 85610 PROTHROMBIN TIME: CPT

## 2024-05-03 PROCEDURE — 70498 CT ANGIOGRAPHY NECK: CPT

## 2024-05-03 PROCEDURE — 82803 BLOOD GASES ANY COMBINATION: CPT

## 2024-05-03 PROCEDURE — 6360000002 HC RX W HCPCS: Performed by: STUDENT IN AN ORGANIZED HEALTH CARE EDUCATION/TRAINING PROGRAM

## 2024-05-03 PROCEDURE — 80061 LIPID PANEL: CPT

## 2024-05-03 PROCEDURE — 70551 MRI BRAIN STEM W/O DYE: CPT

## 2024-05-03 PROCEDURE — 82565 ASSAY OF CREATININE: CPT

## 2024-05-03 PROCEDURE — 84484 ASSAY OF TROPONIN QUANT: CPT

## 2024-05-03 PROCEDURE — 99223 1ST HOSP IP/OBS HIGH 75: CPT | Performed by: PSYCHIATRY & NEUROLOGY

## 2024-05-03 PROCEDURE — 6360000004 HC RX CONTRAST MEDICATION

## 2024-05-03 PROCEDURE — 85014 HEMATOCRIT: CPT

## 2024-05-03 PROCEDURE — 84443 ASSAY THYROID STIM HORMONE: CPT

## 2024-05-03 PROCEDURE — 82553 CREATINE MB FRACTION: CPT

## 2024-05-03 RX ORDER — POLYETHYLENE GLYCOL 3350 17 G/17G
17 POWDER, FOR SOLUTION ORAL DAILY PRN
Status: DISCONTINUED | OUTPATIENT
Start: 2024-05-03 | End: 2024-05-04 | Stop reason: HOSPADM

## 2024-05-03 RX ORDER — SODIUM CHLORIDE 0.9 % (FLUSH) 0.9 %
5-40 SYRINGE (ML) INJECTION PRN
Status: DISCONTINUED | OUTPATIENT
Start: 2024-05-03 | End: 2024-05-04 | Stop reason: HOSPADM

## 2024-05-03 RX ORDER — POTASSIUM CHLORIDE 7.45 MG/ML
10 INJECTION INTRAVENOUS PRN
Status: DISCONTINUED | OUTPATIENT
Start: 2024-05-03 | End: 2024-05-04 | Stop reason: HOSPADM

## 2024-05-03 RX ORDER — ASPIRIN 81 MG/1
81 TABLET, CHEWABLE ORAL DAILY
Status: DISCONTINUED | OUTPATIENT
Start: 2024-05-03 | End: 2024-05-04 | Stop reason: HOSPADM

## 2024-05-03 RX ORDER — LORAZEPAM 2 MG/ML
1 INJECTION INTRAMUSCULAR
Status: ACTIVE | OUTPATIENT
Start: 2024-05-03 | End: 2024-05-04

## 2024-05-03 RX ORDER — ACETAMINOPHEN 325 MG/1
650 TABLET ORAL EVERY 6 HOURS PRN
Status: DISCONTINUED | OUTPATIENT
Start: 2024-05-03 | End: 2024-05-04 | Stop reason: HOSPADM

## 2024-05-03 RX ORDER — IBUPROFEN 400 MG/1
400 TABLET ORAL EVERY 6 HOURS PRN
Status: DISCONTINUED | OUTPATIENT
Start: 2024-05-03 | End: 2024-05-04 | Stop reason: HOSPADM

## 2024-05-03 RX ORDER — ENOXAPARIN SODIUM 100 MG/ML
40 INJECTION SUBCUTANEOUS DAILY
Status: DISCONTINUED | OUTPATIENT
Start: 2024-05-03 | End: 2024-05-03

## 2024-05-03 RX ORDER — ONDANSETRON 4 MG/1
4 TABLET, ORALLY DISINTEGRATING ORAL EVERY 8 HOURS PRN
Status: DISCONTINUED | OUTPATIENT
Start: 2024-05-03 | End: 2024-05-04 | Stop reason: HOSPADM

## 2024-05-03 RX ORDER — LORAZEPAM 2 MG/ML
4 INJECTION INTRAMUSCULAR EVERY 5 MIN PRN
Status: DISCONTINUED | OUTPATIENT
Start: 2024-05-03 | End: 2024-05-04 | Stop reason: HOSPADM

## 2024-05-03 RX ORDER — DIPHENHYDRAMINE HYDROCHLORIDE 50 MG/ML
50 INJECTION INTRAMUSCULAR; INTRAVENOUS EVERY 6 HOURS PRN
Status: DISCONTINUED | OUTPATIENT
Start: 2024-05-03 | End: 2024-05-04 | Stop reason: HOSPADM

## 2024-05-03 RX ORDER — SODIUM CHLORIDE 0.9 % (FLUSH) 0.9 %
5-40 SYRINGE (ML) INJECTION EVERY 12 HOURS SCHEDULED
Status: DISCONTINUED | OUTPATIENT
Start: 2024-05-03 | End: 2024-05-04 | Stop reason: HOSPADM

## 2024-05-03 RX ORDER — POLYETHYLENE GLYCOL 3350 17 G
2 POWDER IN PACKET (EA) ORAL
Status: DISCONTINUED | OUTPATIENT
Start: 2024-05-03 | End: 2024-05-04 | Stop reason: HOSPADM

## 2024-05-03 RX ORDER — LORAZEPAM 2 MG/ML
2 INJECTION INTRAMUSCULAR EVERY 6 HOURS PRN
Status: DISCONTINUED | OUTPATIENT
Start: 2024-05-03 | End: 2024-05-04 | Stop reason: HOSPADM

## 2024-05-03 RX ORDER — HALOPERIDOL 5 MG/ML
5 INJECTION INTRAMUSCULAR EVERY 6 HOURS PRN
Status: DISCONTINUED | OUTPATIENT
Start: 2024-05-03 | End: 2024-05-04 | Stop reason: HOSPADM

## 2024-05-03 RX ORDER — LISINOPRIL 2.5 MG/1
2.5 TABLET ORAL DAILY
Status: DISCONTINUED | OUTPATIENT
Start: 2024-05-03 | End: 2024-05-04 | Stop reason: HOSPADM

## 2024-05-03 RX ORDER — ENOXAPARIN SODIUM 100 MG/ML
40 INJECTION SUBCUTANEOUS DAILY
Status: DISCONTINUED | OUTPATIENT
Start: 2024-05-03 | End: 2024-05-04

## 2024-05-03 RX ORDER — TRAZODONE HYDROCHLORIDE 50 MG/1
50 TABLET ORAL NIGHTLY PRN
Status: DISCONTINUED | OUTPATIENT
Start: 2024-05-03 | End: 2024-05-04 | Stop reason: HOSPADM

## 2024-05-03 RX ORDER — HYDROXYZINE HYDROCHLORIDE 25 MG/1
50 TABLET, FILM COATED ORAL 3 TIMES DAILY PRN
Status: DISCONTINUED | OUTPATIENT
Start: 2024-05-03 | End: 2024-05-04 | Stop reason: HOSPADM

## 2024-05-03 RX ORDER — SODIUM CHLORIDE 9 MG/ML
INJECTION, SOLUTION INTRAVENOUS PRN
Status: DISCONTINUED | OUTPATIENT
Start: 2024-05-03 | End: 2024-05-04 | Stop reason: HOSPADM

## 2024-05-03 RX ORDER — RISPERIDONE 1 MG/1
1 TABLET ORAL DAILY
Status: DISCONTINUED | OUTPATIENT
Start: 2024-05-03 | End: 2024-05-04 | Stop reason: HOSPADM

## 2024-05-03 RX ORDER — LORAZEPAM 2 MG/1
2 TABLET ORAL EVERY 6 HOURS PRN
Status: DISCONTINUED | OUTPATIENT
Start: 2024-05-03 | End: 2024-05-04 | Stop reason: HOSPADM

## 2024-05-03 RX ORDER — MAGNESIUM HYDROXIDE/ALUMINUM HYDROXICE/SIMETHICONE 120; 1200; 1200 MG/30ML; MG/30ML; MG/30ML
30 SUSPENSION ORAL EVERY 6 HOURS PRN
Status: DISCONTINUED | OUTPATIENT
Start: 2024-05-03 | End: 2024-05-04 | Stop reason: HOSPADM

## 2024-05-03 RX ORDER — MAGNESIUM SULFATE IN WATER 40 MG/ML
2000 INJECTION, SOLUTION INTRAVENOUS PRN
Status: DISCONTINUED | OUTPATIENT
Start: 2024-05-03 | End: 2024-05-04 | Stop reason: HOSPADM

## 2024-05-03 RX ORDER — ONDANSETRON 2 MG/ML
4 INJECTION INTRAMUSCULAR; INTRAVENOUS EVERY 6 HOURS PRN
Status: DISCONTINUED | OUTPATIENT
Start: 2024-05-03 | End: 2024-05-04 | Stop reason: HOSPADM

## 2024-05-03 RX ORDER — HALOPERIDOL 5 MG/1
5 TABLET ORAL EVERY 6 HOURS PRN
Status: DISCONTINUED | OUTPATIENT
Start: 2024-05-03 | End: 2024-05-04 | Stop reason: HOSPADM

## 2024-05-03 RX ORDER — POTASSIUM CHLORIDE 20 MEQ/1
40 TABLET, EXTENDED RELEASE ORAL PRN
Status: DISCONTINUED | OUTPATIENT
Start: 2024-05-03 | End: 2024-05-04 | Stop reason: HOSPADM

## 2024-05-03 RX ORDER — ACETAMINOPHEN 650 MG/1
650 SUPPOSITORY RECTAL EVERY 6 HOURS PRN
Status: DISCONTINUED | OUTPATIENT
Start: 2024-05-03 | End: 2024-05-04 | Stop reason: HOSPADM

## 2024-05-03 RX ORDER — ATORVASTATIN CALCIUM 80 MG/1
80 TABLET, FILM COATED ORAL NIGHTLY
Status: DISCONTINUED | OUTPATIENT
Start: 2024-05-03 | End: 2024-05-04 | Stop reason: HOSPADM

## 2024-05-03 RX ADMIN — SODIUM CHLORIDE, PRESERVATIVE FREE 10 ML: 5 INJECTION INTRAVENOUS at 11:07

## 2024-05-03 RX ADMIN — IOPAMIDOL 90 ML: 755 INJECTION, SOLUTION INTRAVENOUS at 06:09

## 2024-05-03 RX ADMIN — RISPERIDONE 1 MG: 1 TABLET, FILM COATED ORAL at 11:04

## 2024-05-03 RX ADMIN — SODIUM CHLORIDE, PRESERVATIVE FREE 10 ML: 5 INJECTION INTRAVENOUS at 21:11

## 2024-05-03 RX ADMIN — ATORVASTATIN CALCIUM 80 MG: 80 TABLET, FILM COATED ORAL at 21:00

## 2024-05-03 RX ADMIN — METOPROLOL TARTRATE 25 MG: 25 TABLET, FILM COATED ORAL at 11:03

## 2024-05-03 RX ADMIN — SODIUM CHLORIDE, PRESERVATIVE FREE 10 ML: 5 INJECTION INTRAVENOUS at 21:12

## 2024-05-03 RX ADMIN — LISINOPRIL 2.5 MG: 2.5 TABLET ORAL at 11:04

## 2024-05-03 RX ADMIN — APIXABAN 5 MG: 5 TABLET, FILM COATED ORAL at 11:03

## 2024-05-03 RX ADMIN — APIXABAN 5 MG: 5 TABLET, FILM COATED ORAL at 21:00

## 2024-05-03 RX ADMIN — ENOXAPARIN SODIUM 40 MG: 100 INJECTION SUBCUTANEOUS at 14:45

## 2024-05-03 RX ADMIN — ASPIRIN 81 MG 81 MG: 81 TABLET ORAL at 11:03

## 2024-05-03 RX ADMIN — SERTRALINE 50 MG: 50 TABLET, FILM COATED ORAL at 21:00

## 2024-05-03 ASSESSMENT — ENCOUNTER SYMPTOMS
VOMITING: 0
NAUSEA: 0
DIARRHEA: 0
SHORTNESS OF BREATH: 0
COUGH: 0
BACK PAIN: 0
ABDOMINAL PAIN: 0

## 2024-05-03 ASSESSMENT — PAIN - FUNCTIONAL ASSESSMENT: PAIN_FUNCTIONAL_ASSESSMENT: NONE - DENIES PAIN
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
## 2024-05-03 NOTE — PROGRESS NOTES
Newly admitted patient came on unit via stretcher. Awake , alert , oriented x 4. Admission Dx : Left side numbness the patient oriented to surrounding , attached to bedside monitor. Safety measures initiated , call light within reach.

## 2024-05-03 NOTE — ED NOTES
Notified neuro patient is claustrophobic to MRI, asked if she could receive anti-anxiety med prior to MRI.

## 2024-05-03 NOTE — ED NOTES
The following labs were labeled with appropriate pt sticker and tubed to lab:     [x] Blue     [x] Lavender   [] on ice  [x] Green/yellow  [] Green/black [] on ice  [] Vicente  [] on ice  [x] Yellow  [x] Red  [] Pink  [] Type/ Screen  [] ABG  [] VBG    [] COVID-19 swab    [] Rapid  [] PCR  [] Flu swab  [] Peds Viral Panel     [] Urine Sample  [] Fecal Sample  [] Pelvic Cultures  [] Blood Cultures  [] X 2  [] STREP Cultures  [] Wound Cultures

## 2024-05-03 NOTE — PROGRESS NOTES
[facility]     Emergency/Trauma Note    PATIENT NAME: Leila Murphy    Shift date: 5/2/2024  Shift day: Thursday   Shift # 3    Room # 22/22   Name: Leila Murphy            Age: 63 y.o.  Gender: female          Shinto: Alevism   Place of Alevism:     Trauma/Incident type: Stroke Alert  Admit Date & Time: 5/3/2024  5:46 AM  TRAUMA NAME:     ADVANCE DIRECTIVES IN CHART?  No    NAME OF DECISION MAKER:     RELATIONSHIP OF DECISION MAKER TO PATIENT:     PATIENT/EVENT DESCRIPTION:  Leila Murphy is a 63 y.o. female who arrived as a  Stroke Alert.  Patient presents with complaint of numbness and tingling on her left side. Pt to be admitted to 22/22.         SPIRITUAL ASSESSMENT-INTERVENTION-OUTCOME:   was ministry of presence.  engaged patient and family member in conversation. Patient appeared mildly confused.  outlined support services available.  PATIENT BELONGINGS:  No belongings noted    ANY BELONGINGS OF SIGNIFICANT VALUE NOTED:  No    REGISTRATION STAFF NOTIFIED?  No      WHAT IS YOUR SPIRITUAL CARE PLAN FOR THIS PATIENT?:   Chaplains are available 24/7 via Perfect serve.  Electronically signed by Chaplain Miguel, on 5/3/2024 at 6:32 AM.  [facility]  [phone]

## 2024-05-03 NOTE — ED NOTES
Pt presents to the ED via triage with c/o numbness and tingling  Pt states she went to sleep at around 2330 last night feeling normal. She woke up at 0430 feeling normal, used the restroom then began feeling the numbness  Pt states she woke up 45 minutes ago feeling \"funny\".  Pt admits numbness and tingling to L side. LKW 0430  Pt AxO x4  Pt admits pressure to top of head  Pt admits feeling as if her ears are clogged.  Denies chest pain, abdominal pain  Pt takes Eliquis daily for Afib  Denies hx of stroke
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
## 2024-05-03 NOTE — ED PROVIDER NOTES
[facility]     Emergency Department     Faculty Attestation    I performed a history and physical examination of the patient and discussed management with the resident. I have reviewed and agree with the resident’s findings including all diagnostic interpretations, and treatment plans as written. Any areas of disagreement are noted on the chart. I was personally present for the key portions of any procedures. I have documented in the chart those procedures where I was not present during the key portions. I have reviewed the emergency nurses triage note. I agree with the chief complaint, past medical history, past surgical history, allergies, medications, social and family history as documented unless otherwise noted below. Documentation of the HPI, Physical Exam and Medical Decision Making performed by eyad is based on my personal performance of the HPI, PE and MDM. For Physician Assistant/ Nurse Practitioner cases/documentation I have personally evaluated this patient and have completed at least one if not all key elements of the E/M (history, physical exam, and MDM). Additional findings are as noted.    Note Started: 5:58 AM EDT     64 yo F L arm L leg numbness starting around 0430 when awakening, last known well 2330  Patient currently has no numbness or tingling, no injury, no fever, no chest pain, no abdominal pain  KURTIS Colón RN escort for exam: vss gcs 15 WILBERT, hand grasp equal, plantarflexion equal,  Sensation intact extremities x 4  Abdomen nontender, no distention, no rigidity, no guarding    -neuro admit,     EKG Interpretation    Interpreted by me  Normal sinus, heart rate 79, no ischemia, left axis, QT corrected 435    CRITICAL CARE: There was a high probability of clinically significant/life threatening deterioration in this patient's condition which required my urgent intervention.  Total critical care time was 5 minutes.  This excludes any

## 2024-05-03 NOTE — PLAN OF CARE
Problem: Discharge Planning  Goal: Discharge to home or other facility with appropriate resources  Flowsheets (Taken 5/3/2024 1800)  Discharge to home or other facility with appropriate resources: Identify barriers to discharge with patient and caregiver     Problem: ABCDS Injury Assessment  Goal: Absence of physical injury  Flowsheets (Taken 5/3/2024 1800)  Absence of Physical Injury: Implement safety measures based on patient assessment     Problem: Safety - Adult  Goal: Free from fall injury  Flowsheets (Taken 5/3/2024 1800)  Free From Fall Injury: Instruct family/caregiver on patient safety

## 2024-05-04 ENCOUNTER — APPOINTMENT (OUTPATIENT)
Age: 64
DRG: 058 | End: 2024-05-04
Payer: COMMERCIAL

## 2024-05-04 VITALS
SYSTOLIC BLOOD PRESSURE: 133 MMHG | OXYGEN SATURATION: 94 % | TEMPERATURE: 98.5 F | RESPIRATION RATE: 19 BRPM | DIASTOLIC BLOOD PRESSURE: 63 MMHG | HEART RATE: 67 BPM

## 2024-05-04 LAB
ANION GAP SERPL CALCULATED.3IONS-SCNC: 12 MMOL/L (ref 9–16)
BUN SERPL-MCNC: 12 MG/DL (ref 8–23)
CALCIUM SERPL-MCNC: 9.1 MG/DL (ref 8.6–10.4)
CHLORIDE SERPL-SCNC: 104 MMOL/L (ref 98–107)
CO2 SERPL-SCNC: 25 MMOL/L (ref 20–31)
CREAT SERPL-MCNC: 0.7 MG/DL (ref 0.5–0.9)
EKG ATRIAL RATE: 68 BPM
EKG ATRIAL RATE: 79 BPM
EKG P AXIS: 41 DEGREES
EKG P AXIS: 44 DEGREES
EKG P-R INTERVAL: 170 MS
EKG P-R INTERVAL: 178 MS
EKG Q-T INTERVAL: 380 MS
EKG Q-T INTERVAL: 404 MS
EKG QRS DURATION: 74 MS
EKG QRS DURATION: 84 MS
EKG QTC CALCULATION (BAZETT): 429 MS
EKG QTC CALCULATION (BAZETT): 435 MS
EKG R AXIS: 4 DEGREES
EKG R AXIS: 4 DEGREES
EKG T AXIS: 32 DEGREES
EKG T AXIS: 9 DEGREES
EKG VENTRICULAR RATE: 68 BPM
EKG VENTRICULAR RATE: 79 BPM
ERYTHROCYTE [DISTWIDTH] IN BLOOD BY AUTOMATED COUNT: 13.2 % (ref 11.8–14.4)
GFR, ESTIMATED: >90 ML/MIN/1.73M2
GLUCOSE SERPL-MCNC: 111 MG/DL (ref 74–99)
HCT VFR BLD AUTO: 43.3 % (ref 36.3–47.1)
HGB BLD-MCNC: 12.9 G/DL (ref 11.9–15.1)
MCH RBC QN AUTO: 30.6 PG (ref 25.2–33.5)
MCHC RBC AUTO-ENTMCNC: 29.8 G/DL (ref 28.4–34.8)
MCV RBC AUTO: 102.9 FL (ref 82.6–102.9)
NRBC BLD-RTO: 0 PER 100 WBC
PLATELET # BLD AUTO: 169 K/UL (ref 138–453)
PMV BLD AUTO: 11 FL (ref 8.1–13.5)
POTASSIUM SERPL-SCNC: 4.1 MMOL/L (ref 3.7–5.3)
RBC # BLD AUTO: 4.21 M/UL (ref 3.95–5.11)
SODIUM SERPL-SCNC: 141 MMOL/L (ref 136–145)
TROPONIN I SERPL HS-MCNC: <6 NG/L (ref 0–14)
WBC OTHER # BLD: 5.2 K/UL (ref 3.5–11.3)

## 2024-05-04 PROCEDURE — 6370000000 HC RX 637 (ALT 250 FOR IP)

## 2024-05-04 PROCEDURE — 93010 ELECTROCARDIOGRAM REPORT: CPT | Performed by: INTERNAL MEDICINE

## 2024-05-04 PROCEDURE — 6360000002 HC RX W HCPCS

## 2024-05-04 PROCEDURE — 99239 HOSP IP/OBS DSCHRG MGMT >30: CPT | Performed by: PSYCHIATRY & NEUROLOGY

## 2024-05-04 PROCEDURE — 84484 ASSAY OF TROPONIN QUANT: CPT

## 2024-05-04 PROCEDURE — 2580000003 HC RX 258

## 2024-05-04 PROCEDURE — 85027 COMPLETE CBC AUTOMATED: CPT

## 2024-05-04 PROCEDURE — 36415 COLL VENOUS BLD VENIPUNCTURE: CPT

## 2024-05-04 PROCEDURE — 93005 ELECTROCARDIOGRAM TRACING: CPT | Performed by: PSYCHIATRY & NEUROLOGY

## 2024-05-04 PROCEDURE — 80048 BASIC METABOLIC PNL TOTAL CA: CPT

## 2024-05-04 RX ADMIN — LISINOPRIL 2.5 MG: 2.5 TABLET ORAL at 08:23

## 2024-05-04 RX ADMIN — SODIUM CHLORIDE, PRESERVATIVE FREE 10 ML: 5 INJECTION INTRAVENOUS at 08:24

## 2024-05-04 RX ADMIN — ENOXAPARIN SODIUM 40 MG: 100 INJECTION SUBCUTANEOUS at 08:22

## 2024-05-04 RX ADMIN — APIXABAN 5 MG: 5 TABLET, FILM COATED ORAL at 08:22

## 2024-05-04 RX ADMIN — ASPIRIN 81 MG 81 MG: 81 TABLET ORAL at 08:22

## 2024-05-04 RX ADMIN — RISPERIDONE 1 MG: 1 TABLET, FILM COATED ORAL at 08:23

## 2024-05-04 RX ADMIN — METOPROLOL TARTRATE 25 MG: 25 TABLET, FILM COATED ORAL at 08:22

## 2024-05-04 ASSESSMENT — ENCOUNTER SYMPTOMS
ABDOMINAL PAIN: 0
SHORTNESS OF BREATH: 0
VOMITING: 0
CHOKING: 0
DIARRHEA: 0
RHINORRHEA: 0
COLOR CHANGE: 0
PHOTOPHOBIA: 0
COUGH: 0
ABDOMINAL DISTENTION: 0
BACK PAIN: 0
SORE THROAT: 0
CONSTIPATION: 0
WHEEZING: 0
NAUSEA: 0

## 2024-05-04 ASSESSMENT — PAIN SCALES - GENERAL
PAINLEVEL_OUTOF10: 0
PAINLEVEL_OUTOF10: 0

## 2024-05-04 NOTE — PROGRESS NOTES
Pt discharged , left unit walking with brother. All belongings in possessions along with discharge instructions papers. All questions answered , verbalized understanding via feedback.

## 2024-05-04 NOTE — CARE COORDINATION
Case Management Assessment  Initial Evaluation    Date/Time of Evaluation: 5/4/2024 8:35 AM  Assessment Completed by: TIFFANIE BECERRA RN    If patient is discharged prior to next notation, then this note serves as note for discharge by case management.    Patient Name: Leila Murphy                   YOB: 1960  Diagnosis: Atrial fibrillation with RVR (HCC) [I48.91]  Left sided numbness [R20.0]                   Date / Time: 5/3/2024  5:46 AM    Patient Admission Status: Inpatient   Readmission Risk (Low < 19, Mod (19-27), High > 27): Readmission Risk Score: 17    Current PCP: Esperanza Campo MD  PCP verified by CM? Yes    Chart Reviewed: Yes      History Provided by: Patient  Patient Orientation: Alert and Oriented    Patient Cognition: Alert    Hospitalization in the last 30 days (Readmission):  No    If yes, Readmission Assessment in CM Navigator will be completed.    Advance Directives:      Code Status: Full Code   Patient's Primary Decision Maker is: Legal Next of Kin      Discharge Planning:    Patient lives with: (P) Children Type of Home: (P) House (Brother's home)  Primary Care Giver: Self  Patient Support Systems include: Family Members   Current Financial resources:    Current community resources:    Current services prior to admission: (P) None            Current DME:              Type of Home Care services:  (P) None    ADLS  Prior functional level: Independent in ADLs/IADLs  Current functional level: Independent in ADLs/IADLs    PT AM-PAC:   /24  OT AM-PAC:   /24    Family can provide assistance at DC: (P) Yes  Would you like Case Management to discuss the discharge plan with any other family members/significant others, and if so, who? (P) No  Plans to Return to Present Housing: Yes  Other Identified Issues/Barriers to RETURNING to current housing: Medical complications  Potential Assistance needed at discharge: (P) N/A            Potential DME:    Patient expects to discharge to:

## 2024-05-04 NOTE — PROGRESS NOTES
Assessment: Patient was awake and alert when  visited. Family was not present at the time. However, patient did make mention of her two children. When asked how she was feeling, patient responded, \"I am feeling the same.\" Patient said she was raised Buddhism. Patient declined anointing of the sick.   Intervention:  maintained listening presence, offered support, prayed with patient and reassured her that she was in good hands.  Outcome: Patient expressed appreciation for the spiritual and emotional support she received.   Plan: Follow up visits recommended for more prayers and support.

## 2024-05-04 NOTE — PLAN OF CARE
Problem: Discharge Planning  Goal: Discharge to home or other facility with appropriate resources  5/4/2024 1528 by Estela Eckert, MARJAN  Outcome: Adequate for Discharge  Flowsheets (Taken 5/4/2024 0800)  Discharge to home or other facility with appropriate resources: Identify barriers to discharge with patient and caregiver  5/4/2024 0524 by Esteban Frye, RN  Outcome: Progressing  Flowsheets (Taken 5/3/2024 2000)  Discharge to home or other facility with appropriate resources: Identify barriers to discharge with patient and caregiver     Problem: ABCDS Injury Assessment  Goal: Absence of physical injury  5/4/2024 1528 by Estela Eckert, RN  Outcome: Adequate for Discharge  5/4/2024 0524 by Esteban Frye, RN  Outcome: Progressing     Problem: Safety - Adult  Goal: Free from fall injury  5/4/2024 1528 by Estela Eckert, RN  Outcome: Adequate for Discharge  5/4/2024 0524 by Esteban Frye, RN  Outcome: Progressing

## 2024-05-04 NOTE — PLAN OF CARE
Problem: Discharge Planning  Goal: Discharge to home or other facility with appropriate resources  5/4/2024 0524 by Esteban Frye, RN  Outcome: Progressing  Flowsheets (Taken 5/3/2024 2000)  Discharge to home or other facility with appropriate resources: Identify barriers to discharge with patient and caregiver     Problem: ABCDS Injury Assessment  Goal: Absence of physical injury  5/4/2024 0524 by Esteban Frye, RN  Outcome: Progressing     Problem: Safety - Adult  Goal: Free from fall injury  5/4/2024 0524 by Esteban Frye, RN  Outcome: Progressing
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
## 2024-05-04 NOTE — DISCHARGE SUMMARY
[facility]     Department of Neurology    INPATIENT DISCHARGE SUMMARY        Patient Identification:  Leila Murphy is a 63 y.o. female.  :  1960  MRN: [id number]     Acct: [id number]   Admit Date:  5/3/2024  Discharge date and time: 24    Attending Provider: Harshad Gleason DO                                     Admission Diagnoses:   Atrial fibrillation with RVR (HCC) [I48.91]  Left sided numbness [R20.0]    Discharge Diagnoses:   Principal Problem:    Left sided numbness  Active Problems:    Aneurysm of ascending aorta without rupture (HCC)  Resolved Problems:    * No resolved hospital problems. *       Consults:     Cardiothoracic surgery     Brief Inpatient course:    63 y.o. female with past medical history of paroxysmal atrial fibrillation on Eliquis, hypertension, presented with transient left upper extremity numbness lasting about 1 hour patient admits that she missed dose of Eliquis. Was seen as a stroke alert, had a CT head which was unremarkable, CTA neck was negative for LVO or critical stenosis however did show an aneurysmal dilatation of the ascending aorta measures 4 cm in diameter. Cardiothoracic surgery was consulted.    Lab Results   Component Value Date    CHOL 211 (H) 2024    TRIG 82 2024    HDL 80 2024    LABA1C 5.1 2024    TSH 2.52 2024     Recent echo was done in 3/8/2024 and EF was 40% Mildly increased wall thickness. Difficult to assess due to tachycardia/afib. Unable to assess wall motion. Abnormal diastolic function.     MRI brain wo contrast was negative for acute abnormalities       Patient likely had a TIA possibly embolic due to missing anticoagulation.   Plan to continue Eliquis 5 mg twice daily  Continue Lipitor 40 mg nightly    Patient is stable from neurological standpoint to be discharged.    Procedures:  None    Any Hospital Acquired Infections: none    Discharge Functional Status:  stable    Disposition:

## 2024-05-04 NOTE — PLAN OF CARE
Patient seen and examined. Recommend follow up in one month with CT chest. Patient giving card with number.    JORGE Ibrahim

## 2024-05-05 LAB
EKG ATRIAL RATE: 68 BPM
EKG P AXIS: 41 DEGREES
EKG P-R INTERVAL: 178 MS
EKG Q-T INTERVAL: 404 MS
EKG QRS DURATION: 74 MS
EKG QTC CALCULATION (BAZETT): 429 MS
EKG R AXIS: 4 DEGREES
EKG T AXIS: 9 DEGREES
EKG VENTRICULAR RATE: 68 BPM

## 2024-05-05 PROCEDURE — 93010 ELECTROCARDIOGRAM REPORT: CPT | Performed by: INTERNAL MEDICINE

## 2024-06-05 ENCOUNTER — HOSPITAL ENCOUNTER (OUTPATIENT)
Age: 64
Discharge: HOME | End: 2024-06-07
Payer: COMMERCIAL

## 2024-06-05 DIAGNOSIS — I71.21 ANEURYSM OF ASCENDING AORTA WITHOUT RUPTURE (HCC): ICD-10-CM

## 2024-06-05 PROCEDURE — 6360000004 HC RX CONTRAST MEDICATION

## 2024-06-05 PROCEDURE — 71275 CT ANGIOGRAPHY CHEST: CPT

## 2024-06-05 RX ADMIN — IOPAMIDOL 100 ML: 755 INJECTION, SOLUTION INTRAVENOUS at 09:30
